# Patient Record
Sex: FEMALE | Race: WHITE | NOT HISPANIC OR LATINO | Employment: STUDENT | URBAN - METROPOLITAN AREA
[De-identification: names, ages, dates, MRNs, and addresses within clinical notes are randomized per-mention and may not be internally consistent; named-entity substitution may affect disease eponyms.]

---

## 2017-08-28 ENCOUNTER — CLINICAL SUPPORT (OUTPATIENT)
Dept: URGENT CARE | Facility: CLINIC | Age: 27
End: 2017-08-28
Payer: COMMERCIAL

## 2017-08-28 DIAGNOSIS — Z11.59 SCREENING FOR VIRAL DISEASE: Primary | ICD-10-CM

## 2017-08-28 DIAGNOSIS — Z11.1 SCREENING EXAMINATION FOR PULMONARY TUBERCULOSIS: ICD-10-CM

## 2017-08-28 PROCEDURE — 86580 TB INTRADERMAL TEST: CPT | Mod: S$GLB,,, | Performed by: EMERGENCY MEDICINE

## 2017-08-29 LAB
MEV IGG SER IA-ACNC: >300 AU/ML
RUBV IGG SERPL IA-ACNC: 3.35 INDEX

## 2017-08-30 ENCOUNTER — TELEPHONE (OUTPATIENT)
Dept: URGENT CARE | Facility: CLINIC | Age: 27
End: 2017-08-30

## 2017-08-30 LAB
SPECIMEN STATUS REPORT: NORMAL
TB INDURATION - 48 HR READ: 0 MM
TB INDURATION - 72 HR READ: NORMAL MM
TB SKIN TEST - 48 HR READ: NEGATIVE
TB SKIN TEST - 72 HR READ: NORMAL

## 2017-08-31 LAB
MUV IGG SER IA-ACNC: 242 AU/ML
SPECIMEN STATUS REPORT: NORMAL

## 2017-08-31 NOTE — PROGRESS NOTES
Subjective:       Patient ID: Josefina Boston is a 26 y.o. female.    Vitals:  vitals were not taken for this visit.    Chief Complaint: No chief complaint on file.    Ppd placed on 08/28/17 by Chalino MURRAY    Objective:      Physical Exam    Assessment:       1. Screening for viral disease    2. Screening examination for pulmonary tuberculosis        Plan:         Screening for viral disease  -     Mumps, IgG Screen  -     Rubeola antibody IgG  -     Rubella antibody, IgG    Screening examination for pulmonary tuberculosis  -     POCT TB Skin Test Read    Other orders  -     Specimen Status Report

## 2017-09-02 ENCOUNTER — TELEPHONE (OUTPATIENT)
Dept: URGENT CARE | Facility: CLINIC | Age: 27
End: 2017-09-02

## 2017-09-02 NOTE — TELEPHONE ENCOUNTER
----- Message from David Odom MD sent at 8/31/2017  7:50 PM CDT -----  These results are normal. Please notify the patient.

## 2020-03-23 RX ORDER — ALPRAZOLAM 0.25 MG/1
0.25 TABLET ORAL 2 TIMES DAILY PRN
Qty: 30 TABLET | Refills: 0 | Status: SHIPPED | OUTPATIENT
Start: 2020-03-23 | End: 2021-11-19

## 2020-03-23 NOTE — PROGRESS NOTES
Josefina was seen during staff rounds during COVIDpandemic . Coping fairly well but in need of alprazolam  0.25 mg refill.   Last filled 1-24-20 per PCP  #20.    Will refill for #30 .

## 2020-04-21 DIAGNOSIS — Z01.84 ANTIBODY RESPONSE EXAMINATION: ICD-10-CM

## 2020-05-14 ENCOUNTER — LAB VISIT (OUTPATIENT)
Dept: LAB | Facility: HOSPITAL | Age: 30
End: 2020-05-14
Attending: INTERNAL MEDICINE

## 2020-05-14 DIAGNOSIS — Z01.84 ANTIBODY RESPONSE EXAMINATION: ICD-10-CM

## 2020-05-14 LAB — SARS-COV-2 IGG SERPLBLD QL IA.RAPID: NEGATIVE

## 2020-05-14 PROCEDURE — 86769 SARS-COV-2 COVID-19 ANTIBODY: CPT

## 2020-05-14 PROCEDURE — 36415 COLL VENOUS BLD VENIPUNCTURE: CPT

## 2020-06-16 ENCOUNTER — NURSE TRIAGE (OUTPATIENT)
Dept: ADMINISTRATIVE | Facility: CLINIC | Age: 30
End: 2020-06-16

## 2020-06-16 NOTE — TELEPHONE ENCOUNTER
Pt admits to Muscle pain;Sore throat;Severe headache, she states she missed work today, she is a nurse at [employer], she went to [facility] and had covid test, she wants to know if its okay for her to return to work tomorrow, advised her to contact her supervisor and employee health and to call back with any other needs or concerns, caller agreed    Reason for Disposition   COVID-19 Home Isolation, questions about    Additional Information   Negative: SEVERE difficulty breathing (e.g., struggling for each breath, speaks in single words)   Negative: Difficult to awaken or acting confused (e.g., disoriented, slurred speech)   Negative: Bluish (or gray) lips or face now   Negative: Shock suspected (e.g., cold/pale/clammy skin, too weak to stand, low BP, rapid pulse)   Negative: Sounds like a life-threatening emergency to the triager   Negative: [1] COVID-19 exposure AND [2] NO symptoms   Negative: COVID-19 and Breastfeeding, questions about   Negative: [1] Adult with possible COVID-19 symptoms AND [2] triager concerned about severity of symptoms or other causes   Negative: SEVERE or constant chest pain or pressure (Exception: mild central chest pain, present only when coughing)   Negative: MODERATE difficulty breathing (e.g., speaks in phrases, SOB even at rest, pulse 100-120)   Negative: Patient sounds very sick or weak to the triager   Negative: MILD difficulty breathing (e.g., minimal/no SOB at rest, SOB with walking, pulse <100)   Negative: Chest pain or pressure   Negative: Fever > 103 F (39.4 C)   Negative: [1] Fever > 101 F (38.3 C) AND [2] age > 60   Negative: [1] Fever > 100.0 F (37.8 C) AND [2] bedridden (e.g., nursing home patient, CVA, chronic illness, recovering from surgery)   Negative: HIGH RISK patient (e.g., age > 64 years, diabetes, heart or lung disease, weak immune system)   Negative: Fever present > 3 days (72 hours)   Negative: [1] Fever returns after gone for over 24 hours AND [2]  symptoms worse or not improved   Negative: [1] Continuous (nonstop) coughing interferes with work or school AND [2] no improvement using cough treatment per protocol   Negative: [1] COVID-19 infection suspected by caller or triager AND [2] mild symptoms (cough, fever, or others) AND [3] no complications or SOB   Negative: Cough present > 3 weeks   Negative: [1] COVID-19 diagnosed by positive lab test AND [2] mild symptoms (e.g., cough, fever, others) AND [3] no complications or SOB   Negative: [1] COVID-19 diagnosed by HCP (doctor, NP or PA) AND [2] mild symptoms (e.g., cough, fever, others) AND [3] no complications or SOB    Protocols used: CORONAVIRUS (COVID-19) DIAGNOSED OR [id number]

## 2020-10-06 ENCOUNTER — CLINICAL SUPPORT (OUTPATIENT)
Dept: URGENT CARE | Facility: CLINIC | Age: 30
End: 2020-10-06
Payer: COMMERCIAL

## 2020-10-06 ENCOUNTER — NURSE TRIAGE (OUTPATIENT)
Dept: ADMINISTRATIVE | Facility: CLINIC | Age: 30
End: 2020-10-06

## 2020-10-06 DIAGNOSIS — J02.9 SORE THROAT: Primary | ICD-10-CM

## 2020-10-06 DIAGNOSIS — R09.81 SINUS CONGESTION: ICD-10-CM

## 2020-10-06 DIAGNOSIS — J02.9 SORE THROAT: ICD-10-CM

## 2020-10-06 LAB
CTP QC/QA: YES
SARS-COV-2 RDRP RESP QL NAA+PROBE: NEGATIVE

## 2020-10-06 PROCEDURE — U0002 COVID-19 LAB TEST NON-CDC: HCPCS | Mod: QW,S$GLB,, | Performed by: INTERNAL MEDICINE

## 2020-10-06 PROCEDURE — U0002: ICD-10-PCS | Mod: QW,S$GLB,, | Performed by: INTERNAL MEDICINE

## 2020-10-06 NOTE — TELEPHONE ENCOUNTER
Pt is [employer] employee. States she was exposed by a co-worker to COVID-19. Pt states she started with sore throat and sinus-like symptoms today. 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 Patient warm-transferred to Employee Health to set up testing.    Reason for Disposition   [1] Mild body aches, chills, diarrhea, headache, runny nose, or sore throat AND [2] within 14 days of COVID-19 EXPOSURE    Additional Information   Negative: Severe difficulty breathing (e.g., struggling for each breath, speaks in single words)   Negative: Bluish (or gray) lips or face now   Negative: Sounds like a life-threatening emergency to the triager   Negative: [1] Difficulty breathing occurs AND [2] within 14 days of COVID-19 EXPOSURE (Close Contact)   Negative: Patient sounds very sick or weak to the triager   Negative: [1] Fever (or feeling feverish) OR cough AND [2] within 14 Days of COVID-19 EXPOSURE (Close Contact)   Negative: [1] Fever (or feeling feverish) OR cough occurs AND [2] within 14 days of travel from another country (international travel)   Negative: [1] Fever (or feeling feverish) OR cough occurs AND [2] within 14 days of travel from a city or area with major community spread   Negative: [1] Fever (or feeling feverish) OR cough occurs AND [2] living in area with major community spread AND [3] testing being done in the community for symptoms    Protocols used: CORONAVIRUS (COVID-19) - EXPOSURE-A-

## 2020-10-07 ENCOUNTER — TELEPHONE (OUTPATIENT)
Dept: PRIMARY CARE CLINIC | Facility: CLINIC | Age: 30
End: 2020-10-07

## 2021-01-09 ENCOUNTER — IMMUNIZATION (OUTPATIENT)
Dept: INTERNAL MEDICINE | Facility: CLINIC | Age: 31
End: 2021-01-09
Payer: OTHER GOVERNMENT

## 2021-01-09 DIAGNOSIS — Z23 NEED FOR VACCINATION: ICD-10-CM

## 2021-01-09 PROCEDURE — 91300 COVID-19, MRNA, LNP-S, PF, 30 MCG/0.3 ML DOSE VACCINE: CPT | Mod: ,,, | Performed by: INTERNAL MEDICINE

## 2021-01-09 PROCEDURE — 91300 COVID-19, MRNA, LNP-S, PF, 30 MCG/0.3 ML DOSE VACCINE: ICD-10-PCS | Mod: ,,, | Performed by: INTERNAL MEDICINE

## 2021-01-09 PROCEDURE — 0001A COVID-19, MRNA, LNP-S, PF, 30 MCG/0.3 ML DOSE VACCINE: ICD-10-PCS | Mod: CV19,,, | Performed by: INTERNAL MEDICINE

## 2021-01-09 PROCEDURE — 0001A COVID-19, MRNA, LNP-S, PF, 30 MCG/0.3 ML DOSE VACCINE: CPT | Mod: CV19,,, | Performed by: INTERNAL MEDICINE

## 2021-01-30 ENCOUNTER — IMMUNIZATION (OUTPATIENT)
Dept: INTERNAL MEDICINE | Facility: CLINIC | Age: 31
End: 2021-01-30
Payer: OTHER GOVERNMENT

## 2021-01-30 DIAGNOSIS — Z23 NEED FOR VACCINATION: Primary | ICD-10-CM

## 2021-01-30 PROCEDURE — 91300 PR SARS-COV- 2 COVID-19 VACCINE, NO PRSV, 30MCG/0.3ML, IM: ICD-10-PCS | Mod: ,,, | Performed by: INTERNAL MEDICINE

## 2021-01-30 PROCEDURE — 0002A PR IMMUNIZ ADMIN, SARS-COV-2 COVID-19 VACC, 30MCG/0.3ML, 2ND DOSE: ICD-10-PCS | Mod: CV19,,, | Performed by: INTERNAL MEDICINE

## 2021-01-30 PROCEDURE — 0002A PR IMMUNIZ ADMIN, SARS-COV-2 COVID-19 VACC, 30MCG/0.3ML, 2ND DOSE: CPT | Mod: CV19,,, | Performed by: INTERNAL MEDICINE

## 2021-01-30 PROCEDURE — 91300 PR SARS-COV- 2 COVID-19 VACCINE, NO PRSV, 30MCG/0.3ML, IM: CPT | Mod: ,,, | Performed by: INTERNAL MEDICINE

## 2021-01-30 RX ADMIN — Medication 0.3 ML: at 02:01

## 2021-03-18 ENCOUNTER — PATIENT MESSAGE (OUTPATIENT)
Dept: RESEARCH | Facility: HOSPITAL | Age: 31
End: 2021-03-18

## 2021-03-26 ENCOUNTER — PATIENT MESSAGE (OUTPATIENT)
Dept: RESEARCH | Facility: HOSPITAL | Age: 31
End: 2021-03-26

## 2021-08-14 ENCOUNTER — OFFICE VISIT (OUTPATIENT)
Dept: URGENT CARE | Facility: CLINIC | Age: 31
End: 2021-08-14

## 2021-08-14 VITALS
DIASTOLIC BLOOD PRESSURE: 90 MMHG | HEART RATE: 71 BPM | TEMPERATURE: 98 F | RESPIRATION RATE: 18 BRPM | OXYGEN SATURATION: 96 % | SYSTOLIC BLOOD PRESSURE: 132 MMHG

## 2021-08-14 DIAGNOSIS — R05.9 COUGH: Primary | ICD-10-CM

## 2021-08-14 DIAGNOSIS — J06.9 VIRAL URI: ICD-10-CM

## 2021-08-14 LAB
CTP QC/QA: YES
SARS-COV-2 RDRP RESP QL NAA+PROBE: NEGATIVE

## 2021-08-14 PROCEDURE — U0002 COVID-19 LAB TEST NON-CDC: HCPCS | Mod: QW,S$GLB,, | Performed by: NURSE PRACTITIONER

## 2021-08-14 PROCEDURE — 99203 PR OFFICE/OUTPT VISIT, NEW, LEVL III, 30-44 MIN: ICD-10-PCS | Mod: S$GLB,CS,, | Performed by: NURSE PRACTITIONER

## 2021-08-14 PROCEDURE — 99203 OFFICE O/P NEW LOW 30 MIN: CPT | Mod: S$GLB,CS,, | Performed by: NURSE PRACTITIONER

## 2021-08-14 PROCEDURE — U0002: ICD-10-PCS | Mod: QW,S$GLB,, | Performed by: NURSE PRACTITIONER

## 2021-11-19 ENCOUNTER — OFFICE VISIT (OUTPATIENT)
Dept: URGENT CARE | Facility: CLINIC | Age: 31
End: 2021-11-19

## 2021-11-19 VITALS
SYSTOLIC BLOOD PRESSURE: 126 MMHG | HEART RATE: 84 BPM | OXYGEN SATURATION: 97 % | TEMPERATURE: 98 F | RESPIRATION RATE: 18 BRPM | DIASTOLIC BLOOD PRESSURE: 87 MMHG

## 2021-11-19 DIAGNOSIS — R05.9 COUGH: ICD-10-CM

## 2021-11-19 DIAGNOSIS — U07.1 COVID-19: Primary | ICD-10-CM

## 2021-11-19 LAB
CTP QC/QA: YES
CTP QC/QA: YES
POC MOLECULAR INFLUENZA A AGN: NEGATIVE
POC MOLECULAR INFLUENZA B AGN: NEGATIVE
SARS-COV-2 RDRP RESP QL NAA+PROBE: POSITIVE

## 2021-11-19 PROCEDURE — 99213 OFFICE O/P EST LOW 20 MIN: CPT | Mod: S$GLB,,, | Performed by: FAMILY MEDICINE

## 2021-11-19 PROCEDURE — U0002: ICD-10-PCS | Mod: QW,S$GLB,, | Performed by: FAMILY MEDICINE

## 2021-11-19 PROCEDURE — 87502 INFLUENZA DNA AMP PROBE: CPT | Mod: QW,S$GLB,, | Performed by: FAMILY MEDICINE

## 2021-11-19 PROCEDURE — 87502 POCT INFLUENZA A/B MOLECULAR: ICD-10-PCS | Mod: QW,S$GLB,, | Performed by: FAMILY MEDICINE

## 2021-11-19 PROCEDURE — 99213 PR OFFICE/OUTPT VISIT, EST, LEVL III, 20-29 MIN: ICD-10-PCS | Mod: S$GLB,,, | Performed by: FAMILY MEDICINE

## 2021-11-19 PROCEDURE — U0002 COVID-19 LAB TEST NON-CDC: HCPCS | Mod: QW,S$GLB,, | Performed by: FAMILY MEDICINE

## 2021-11-22 ENCOUNTER — INFUSION (OUTPATIENT)
Dept: INFECTIOUS DISEASES | Facility: HOSPITAL | Age: 31
End: 2021-11-22
Attending: FAMILY MEDICINE
Payer: OTHER GOVERNMENT

## 2021-11-22 VITALS
HEIGHT: 62 IN | WEIGHT: 135 LBS | SYSTOLIC BLOOD PRESSURE: 126 MMHG | HEART RATE: 71 BPM | DIASTOLIC BLOOD PRESSURE: 82 MMHG | RESPIRATION RATE: 18 BRPM | OXYGEN SATURATION: 98 % | BODY MASS INDEX: 24.84 KG/M2 | TEMPERATURE: 98 F

## 2021-11-22 DIAGNOSIS — U07.1 COVID-19: Primary | ICD-10-CM

## 2021-11-22 PROCEDURE — 63600175 PHARM REV CODE 636 W HCPCS: Performed by: INTERNAL MEDICINE

## 2021-11-22 PROCEDURE — 25000003 PHARM REV CODE 250: Performed by: INTERNAL MEDICINE

## 2021-11-22 PROCEDURE — M0243 CASIRIVI AND IMDEVI INFUSION: HCPCS | Performed by: INTERNAL MEDICINE

## 2021-11-22 RX ORDER — DIPHENHYDRAMINE HYDROCHLORIDE 50 MG/ML
25 INJECTION INTRAMUSCULAR; INTRAVENOUS ONCE AS NEEDED
Status: DISCONTINUED | OUTPATIENT
Start: 2021-11-22 | End: 2022-06-22

## 2021-11-22 RX ORDER — ALBUTEROL SULFATE 90 UG/1
2 AEROSOL, METERED RESPIRATORY (INHALATION)
Status: DISCONTINUED | OUTPATIENT
Start: 2021-11-22 | End: 2022-06-22

## 2021-11-22 RX ORDER — SODIUM CHLORIDE 0.9 % (FLUSH) 0.9 %
10 SYRINGE (ML) INJECTION
Status: DISCONTINUED | OUTPATIENT
Start: 2021-11-22 | End: 2022-06-22

## 2021-11-22 RX ORDER — ACETAMINOPHEN 325 MG/1
650 TABLET ORAL ONCE AS NEEDED
Status: DISCONTINUED | OUTPATIENT
Start: 2021-11-22 | End: 2022-06-22

## 2021-11-22 RX ORDER — ONDANSETRON 4 MG/1
4 TABLET, ORALLY DISINTEGRATING ORAL ONCE AS NEEDED
Status: DISCONTINUED | OUTPATIENT
Start: 2021-11-22 | End: 2022-06-22

## 2021-11-22 RX ORDER — EPINEPHRINE 0.3 MG/.3ML
0.3 INJECTION SUBCUTANEOUS
Status: DISCONTINUED | OUTPATIENT
Start: 2021-11-22 | End: 2022-06-22

## 2021-11-22 RX ADMIN — CASIRIVIMAB AND IMDEVIMAB 600 MG: 600; 600 INJECTION, SOLUTION, CONCENTRATE INTRAVENOUS at 10:11

## 2022-02-22 ENCOUNTER — PATIENT MESSAGE (OUTPATIENT)
Dept: RESEARCH | Facility: HOSPITAL | Age: 32
End: 2022-02-22
Payer: OTHER GOVERNMENT

## 2022-02-25 RX ORDER — FLUCONAZOLE 150 MG/1
150 TABLET ORAL DAILY
Qty: 10 TABLET | Refills: 0 | Status: SHIPPED | OUTPATIENT
Start: 2022-02-25 | End: 2022-03-07

## 2022-05-23 ENCOUNTER — OFFICE VISIT (OUTPATIENT)
Dept: OBSTETRICS AND GYNECOLOGY | Facility: CLINIC | Age: 32
End: 2022-05-23
Payer: COMMERCIAL

## 2022-05-23 ENCOUNTER — LAB VISIT (OUTPATIENT)
Dept: LAB | Facility: OTHER | Age: 32
End: 2022-05-23
Attending: OBSTETRICS & GYNECOLOGY
Payer: COMMERCIAL

## 2022-05-23 VITALS
SYSTOLIC BLOOD PRESSURE: 108 MMHG | DIASTOLIC BLOOD PRESSURE: 62 MMHG | BODY MASS INDEX: 24.29 KG/M2 | WEIGHT: 132 LBS | HEIGHT: 62 IN

## 2022-05-23 DIAGNOSIS — E34.9 HORMONE IMBALANCE: ICD-10-CM

## 2022-05-23 DIAGNOSIS — R79.89 LOW VITAMIN D LEVEL: ICD-10-CM

## 2022-05-23 DIAGNOSIS — Z01.419 ENCOUNTER FOR ANNUAL ROUTINE GYNECOLOGICAL EXAMINATION: Primary | ICD-10-CM

## 2022-05-23 DIAGNOSIS — Z00.00 HEALTH CARE MAINTENANCE: ICD-10-CM

## 2022-05-23 DIAGNOSIS — Z12.4 PAP SMEAR FOR CERVICAL CANCER SCREENING: ICD-10-CM

## 2022-05-23 DIAGNOSIS — Z11.3 SCREENING FOR STDS (SEXUALLY TRANSMITTED DISEASES): ICD-10-CM

## 2022-05-23 DIAGNOSIS — N94.3 PMS (PREMENSTRUAL SYNDROME): ICD-10-CM

## 2022-05-23 DIAGNOSIS — Z30.09 GENERAL COUNSELING AND ADVICE FOR CONTRACEPTIVE MANAGEMENT: ICD-10-CM

## 2022-05-23 LAB
25(OH)D3+25(OH)D2 SERPL-MCNC: 22 NG/ML (ref 30–96)
ALBUMIN SERPL BCP-MCNC: 4.3 G/DL (ref 3.5–5.2)
ALP SERPL-CCNC: 44 U/L (ref 55–135)
ALT SERPL W/O P-5'-P-CCNC: 13 U/L (ref 10–44)
ANION GAP SERPL CALC-SCNC: 10 MMOL/L (ref 8–16)
AST SERPL-CCNC: 18 U/L (ref 10–40)
BASOPHILS # BLD AUTO: 0.03 K/UL (ref 0–0.2)
BASOPHILS NFR BLD: 0.6 % (ref 0–1.9)
BILIRUB SERPL-MCNC: 0.6 MG/DL (ref 0.1–1)
BUN SERPL-MCNC: 10 MG/DL (ref 6–20)
CALCIUM SERPL-MCNC: 9.3 MG/DL (ref 8.7–10.5)
CHLORIDE SERPL-SCNC: 105 MMOL/L (ref 95–110)
CHOLEST SERPL-MCNC: 144 MG/DL (ref 120–199)
CHOLEST/HDLC SERPL: 2.1 {RATIO} (ref 2–5)
CO2 SERPL-SCNC: 22 MMOL/L (ref 23–29)
CREAT SERPL-MCNC: 0.8 MG/DL (ref 0.5–1.4)
DHEA-S SERPL-MCNC: 301.4 UG/DL (ref 95.8–511.7)
DIFFERENTIAL METHOD: NORMAL
EOSINOPHIL # BLD AUTO: 0.1 K/UL (ref 0–0.5)
EOSINOPHIL NFR BLD: 1 % (ref 0–8)
ERYTHROCYTE [DISTWIDTH] IN BLOOD BY AUTOMATED COUNT: 12.6 % (ref 11.5–14.5)
EST. GFR  (AFRICAN AMERICAN): >60 ML/MIN/1.73 M^2
EST. GFR  (NON AFRICAN AMERICAN): >60 ML/MIN/1.73 M^2
ESTRADIOL SERPL-MCNC: 122 PG/ML
GLUCOSE SERPL-MCNC: 83 MG/DL (ref 70–110)
HCT VFR BLD AUTO: 41.8 % (ref 37–48.5)
HDLC SERPL-MCNC: 67 MG/DL (ref 40–75)
HDLC SERPL: 46.5 % (ref 20–50)
HGB BLD-MCNC: 13.9 G/DL (ref 12–16)
IMM GRANULOCYTES # BLD AUTO: 0.01 K/UL (ref 0–0.04)
IMM GRANULOCYTES NFR BLD AUTO: 0.2 % (ref 0–0.5)
LDLC SERPL CALC-MCNC: 69.2 MG/DL (ref 63–159)
LYMPHOCYTES # BLD AUTO: 1.5 K/UL (ref 1–4.8)
LYMPHOCYTES NFR BLD: 29 % (ref 18–48)
MCH RBC QN AUTO: 27.9 PG (ref 27–31)
MCHC RBC AUTO-ENTMCNC: 33.3 G/DL (ref 32–36)
MCV RBC AUTO: 84 FL (ref 82–98)
MONOCYTES # BLD AUTO: 0.4 K/UL (ref 0.3–1)
MONOCYTES NFR BLD: 7.3 % (ref 4–15)
NEUTROPHILS # BLD AUTO: 3.2 K/UL (ref 1.8–7.7)
NEUTROPHILS NFR BLD: 61.9 % (ref 38–73)
NONHDLC SERPL-MCNC: 77 MG/DL
NRBC BLD-RTO: 0 /100 WBC
PLATELET # BLD AUTO: 185 K/UL (ref 150–450)
PMV BLD AUTO: 9.7 FL (ref 9.2–12.9)
POTASSIUM SERPL-SCNC: 4.1 MMOL/L (ref 3.5–5.1)
PROT SERPL-MCNC: 7.5 G/DL (ref 6–8.4)
RBC # BLD AUTO: 4.99 M/UL (ref 4–5.4)
SODIUM SERPL-SCNC: 137 MMOL/L (ref 136–145)
TESTOST SERPL-MCNC: 34 NG/DL (ref 5–73)
TRIGL SERPL-MCNC: 39 MG/DL (ref 30–150)
TSH SERPL DL<=0.005 MIU/L-ACNC: 0.99 UIU/ML (ref 0.4–4)
WBC # BLD AUTO: 5.1 K/UL (ref 3.9–12.7)

## 2022-05-23 PROCEDURE — 87389 HIV-1 AG W/HIV-1&-2 AB AG IA: CPT | Performed by: OBSTETRICS & GYNECOLOGY

## 2022-05-23 PROCEDURE — 99385 PR PREVENTIVE VISIT,NEW,18-39: ICD-10-PCS | Mod: S$GLB,,, | Performed by: OBSTETRICS & GYNECOLOGY

## 2022-05-23 PROCEDURE — 80053 COMPREHEN METABOLIC PANEL: CPT | Performed by: OBSTETRICS & GYNECOLOGY

## 2022-05-23 PROCEDURE — 87491 CHLMYD TRACH DNA AMP PROBE: CPT | Performed by: OBSTETRICS & GYNECOLOGY

## 2022-05-23 PROCEDURE — 87340 HEPATITIS B SURFACE AG IA: CPT | Performed by: OBSTETRICS & GYNECOLOGY

## 2022-05-23 PROCEDURE — 82627 DEHYDROEPIANDROSTERONE: CPT | Performed by: OBSTETRICS & GYNECOLOGY

## 2022-05-23 PROCEDURE — 82306 VITAMIN D 25 HYDROXY: CPT | Performed by: OBSTETRICS & GYNECOLOGY

## 2022-05-23 PROCEDURE — 84403 ASSAY OF TOTAL TESTOSTERONE: CPT | Performed by: OBSTETRICS & GYNECOLOGY

## 2022-05-23 PROCEDURE — 99999 PR PBB SHADOW E&M-EST. PATIENT-LVL III: CPT | Mod: PBBFAC,,, | Performed by: OBSTETRICS & GYNECOLOGY

## 2022-05-23 PROCEDURE — 84402 ASSAY OF FREE TESTOSTERONE: CPT | Performed by: OBSTETRICS & GYNECOLOGY

## 2022-05-23 PROCEDURE — 82670 ASSAY OF TOTAL ESTRADIOL: CPT | Performed by: OBSTETRICS & GYNECOLOGY

## 2022-05-23 PROCEDURE — 99213 OFFICE O/P EST LOW 20 MIN: CPT | Mod: PBBFAC | Performed by: OBSTETRICS & GYNECOLOGY

## 2022-05-23 PROCEDURE — 99999 PR PBB SHADOW E&M-EST. PATIENT-LVL III: ICD-10-PCS | Mod: PBBFAC,,, | Performed by: OBSTETRICS & GYNECOLOGY

## 2022-05-23 PROCEDURE — 86592 SYPHILIS TEST NON-TREP QUAL: CPT | Performed by: OBSTETRICS & GYNECOLOGY

## 2022-05-23 PROCEDURE — 87591 N.GONORRHOEAE DNA AMP PROB: CPT | Performed by: OBSTETRICS & GYNECOLOGY

## 2022-05-23 PROCEDURE — 85025 COMPLETE CBC W/AUTO DIFF WBC: CPT | Performed by: OBSTETRICS & GYNECOLOGY

## 2022-05-23 PROCEDURE — 88142 CYTOPATH C/V THIN LAYER: CPT | Performed by: OBSTETRICS & GYNECOLOGY

## 2022-05-23 PROCEDURE — 80061 LIPID PANEL: CPT | Performed by: OBSTETRICS & GYNECOLOGY

## 2022-05-23 PROCEDURE — 99385 PREV VISIT NEW AGE 18-39: CPT | Mod: S$GLB,,, | Performed by: OBSTETRICS & GYNECOLOGY

## 2022-05-23 PROCEDURE — 86803 HEPATITIS C AB TEST: CPT | Performed by: OBSTETRICS & GYNECOLOGY

## 2022-05-23 PROCEDURE — 36415 COLL VENOUS BLD VENIPUNCTURE: CPT | Performed by: OBSTETRICS & GYNECOLOGY

## 2022-05-23 PROCEDURE — 84443 ASSAY THYROID STIM HORMONE: CPT | Performed by: OBSTETRICS & GYNECOLOGY

## 2022-05-24 LAB
C TRACH DNA SPEC QL NAA+PROBE: NOT DETECTED
HBV SURFACE AG SERPL QL IA: NEGATIVE
HCV AB SERPL QL IA: NEGATIVE
HIV 1+2 AB+HIV1 P24 AG SERPL QL IA: NEGATIVE
N GONORRHOEA DNA SPEC QL NAA+PROBE: NOT DETECTED
RPR SER QL: NORMAL

## 2022-05-26 LAB
FINAL PATHOLOGIC DIAGNOSIS: NORMAL
Lab: NORMAL

## 2022-06-03 LAB — TESTOST FREE SERPL DL<=1.0 NG/DL-MCNC: 2.9 PG/ML (ref 0.8–7.4)

## 2022-06-17 NOTE — PROGRESS NOTES
"  Chief Complaint: Well Woman Exam   Patient known to me from . Last seen 3 years ago.  HPI:      Josefina Boston is a 31 y.o.  who presents for annual exam. She is currently complaining of irritable mood prior to menstrual cycle and cycle irregularity. She is also overdue for annual exam.    She feels like this has been occurring for the past few months and only right before her menstrual cycle. She feels her mood is irritable with occasional sadness or anxiety. Cycles are regular lasting 3-4 days. Her mother went through menopause in early 40s. No menopausal symptoms. Denies suicidal or homicidal ideations. She feels her hormones are "off" and having acne and pain prior to cycles that is also new. She has also skipped some cycles in past year with last menstrual cycle at end of April. Multiple UPT tests were negative.    Ms. Boston is currently sexually active with a single male partner. She would like STD screening today. Patient has regular monthly menses. Patient's last menstrual period was 2022 (within days). She is currently using condoms for contraception.     Previous Pap: no abnormalities  Last done at . Unsure of date.    Gardasil:Completed     OB History        0    Para   0    Term   0       0    AB   0    Living   0       SAB   0    IAB   0    Ectopic   0    Multiple   0    Live Births   0               ROS:     GENERAL: Denies unintentional weight gain or weight loss. Feeling well overall.   SKIN: Denies rash or lesions.   HEENT: Denies headaches, or vision changes.   CARDIOVASCULAR: Denies palpitations or chest pain.   RESPIRATORY: Denies shortness of breath or dyspnea on exertion.  BREASTS: Denies pain, lumps, or nipple discharge.   ABDOMEN: Denies abdominal pain, occ cramping with constipation or diarrhea- ?IBS diagnosis, nausea, vomiting, or change in appetite.   URINARY: Denies frequency, dysuria, hematuria.  NEUROLOGIC: Denies syncope or weakness.   PSYCHIATRIC: " "Denies depression, anxiety or mood swings.    Physical Exam:      PHYSICAL EXAM:  /62   Ht 5' 2" (1.575 m)   Wt 59.9 kg (132 lb)   LMP 04/25/2022 (Within Days)   BMI 24.14 kg/m²   Body mass index is 24.14 kg/m².     APPEARANCE: Well nourished, well developed, in no acute distress.  PSYCH: Appropriate mood and affect.  SKIN: No acne or hirsutism  NECK: Neck symmetric without masses or thyromegaly  NODES: No inguinal, axillary, or supraclavicular lymph node enlargement  CHEST: Normal respiratory effort.  ABDOMEN: Soft.  No tenderness or masses.   BREASTS: Symmetrical, no skin changes or visible lesions.  No palpable masses or nipple discharge bilaterally.  PELVIC: Normal external genitalia without lesions.  Normal hair distribution.  Adequate perineal body, normal urethral meatus.  Vagina moist and well rugated without lesions or discharge.  Cervix pink, without lesions, discharge or tenderness.  No significant cystocele or rectocele.  Bimanual exam shows uterus to be normal size, regular, mobile and nontender.  Adnexa without masses or tenderness.    EXTREMITIES: No edema.    Assessment/Plan:     Encounter for annual routine gynecological examination  Normal exam today. Pap smear done today. STD screen ordered today. Gardasil completed. Contraception options reviewed and desires Kyleena. Authorization sent. Discussed all menstrual symptoms are likely due to premenstrual syndrome that is likely associated with a hormone imbalance. She requested recheck of Vitamin D in past and has still had fatigue though feel more likely due to hormone imbalance. Screening health labs ordered and referral to PCP done today.    Health care maintenance  -     CBC Auto Differential; Future; Expected date: 05/23/2022  -     Comprehensive Metabolic Panel; Future; Expected date: 05/23/2022  -     Lipid Panel; Future; Expected date: 05/23/2022  -     TSH; Future; Expected date: 05/23/2022  -     Ambulatory referral/consult to " Internal Medicine; Future; Expected date: 05/30/2022    Low vitamin D level  -     Vitamin D; Future; Expected date: 05/23/2022    Hormone imbalance  -     Estradiol; Future; Expected date: 05/23/2022  -     DHEA-Sulfate; Future; Expected date: 05/23/2022  -     Testosterone, Free; Future; Expected date: 05/23/2022  -     Testosterone; Future; Expected date: 05/23/2022  -     Device Authorization Order: Kalpana    Screening for STDs (sexually transmitted diseases)  -     Hepatitis B Surface Antigen; Future; Expected date: 05/23/2022  -     Hepatitis C Antibody; Future; Expected date: 08/23/2022  -     HIV 1/2 Ag/Ab (4th Gen); Future; Expected date: 05/23/2022  -     RPR; Future; Expected date: 05/23/2022  -     C. trachomatis/N. gonorrhoeae by AMP DNA [facility]; Cervix    Pap smear for cervical cancer screening  -     Liquid-Based Pap Smear, Screening    General counseling and advice for contraceptive management  -     Device Authorization Order: Kalpana    PMS (premenstrual syndrome)  -     Device Authorization Order: Kyjiena    RTC 1 year for annual and prn pending results    Counseling:     Patient was counseled today on current ASCCP pap guidelines, the recommendation for yearly pelvic exams, healthy diet and exercise routines, breast self awareness. She is to see her PCP for other health maintenance.       Use of the [facility] Patient Portal discussed and encouraged during today's visit.       Bridgett Mcmullen MD    As of April 1, 2021, the Cures Act has been passed nationally. This new law requires that all doctors progress notes, lab results, pathology reports and radiology reports be released IMMEDIATELY to the patient in the patient portal. That means that the results are released to you at the EXACT same time they are released to me. Therefore, with all of the patients that I have I am not able to reply to each patient exactly when the results come in. So there will be a delay from when you see the results to  when I see them and have time to come up with a response to send you. Also I only see these results when I am on the computer at work. So if the results come in over the weekend or after 5 pm of a work day, I will not see them until the next business day. As you can tell, this is a challenge as a physician to give every patient the quick response they hope for and deserve. So please be patient! Thanks for understanding, Dr. Mcmullen

## 2022-06-22 ENCOUNTER — TELEPHONE (OUTPATIENT)
Dept: OBSTETRICS AND GYNECOLOGY | Facility: CLINIC | Age: 32
End: 2022-06-22
Payer: COMMERCIAL

## 2022-06-22 ENCOUNTER — PATIENT MESSAGE (OUTPATIENT)
Dept: OBSTETRICS AND GYNECOLOGY | Facility: CLINIC | Age: 32
End: 2022-06-22
Payer: COMMERCIAL

## 2022-06-22 NOTE — TELEPHONE ENCOUNTER
----- Message from Bridgett Mcmullen MD sent at 6/22/2022  6:51 AM CDT -----  Regarding: Follow up  Can you check on status of her Kyleena (order sent 5/23) and IM referral as it still says pending.

## 2022-07-21 ENCOUNTER — PATIENT MESSAGE (OUTPATIENT)
Dept: GASTROENTEROLOGY | Facility: CLINIC | Age: 32
End: 2022-07-21
Payer: COMMERCIAL

## 2022-08-09 ENCOUNTER — TELEPHONE (OUTPATIENT)
Dept: ENDOSCOPY | Facility: HOSPITAL | Age: 32
End: 2022-08-09
Payer: COMMERCIAL

## 2022-08-09 ENCOUNTER — LAB VISIT (OUTPATIENT)
Dept: LAB | Facility: HOSPITAL | Age: 32
End: 2022-08-09
Attending: INTERNAL MEDICINE
Payer: COMMERCIAL

## 2022-08-09 ENCOUNTER — OFFICE VISIT (OUTPATIENT)
Dept: GASTROENTEROLOGY | Facility: CLINIC | Age: 32
End: 2022-08-09
Payer: COMMERCIAL

## 2022-08-09 VITALS
SYSTOLIC BLOOD PRESSURE: 122 MMHG | HEART RATE: 66 BPM | WEIGHT: 137.31 LBS | BODY MASS INDEX: 25.27 KG/M2 | DIASTOLIC BLOOD PRESSURE: 83 MMHG | HEIGHT: 62 IN

## 2022-08-09 DIAGNOSIS — Z83.49 FAMILY HISTORY OF THYROID DISEASE: ICD-10-CM

## 2022-08-09 DIAGNOSIS — R63.5 WEIGHT GAIN: ICD-10-CM

## 2022-08-09 DIAGNOSIS — R14.0 ABDOMINAL BLOATING: ICD-10-CM

## 2022-08-09 DIAGNOSIS — R19.4 CHANGE IN BOWEL HABITS: ICD-10-CM

## 2022-08-09 DIAGNOSIS — K59.04 CHRONIC IDIOPATHIC CONSTIPATION: Primary | ICD-10-CM

## 2022-08-09 DIAGNOSIS — K59.04 CHRONIC IDIOPATHIC CONSTIPATION: ICD-10-CM

## 2022-08-09 LAB
HCG INTACT+B SERPL-ACNC: <2.4 MIU/ML
LIPASE SERPL-CCNC: 28 U/L (ref 4–60)

## 2022-08-09 PROCEDURE — 3008F BODY MASS INDEX DOCD: CPT | Mod: CPTII,S$GLB,, | Performed by: INTERNAL MEDICINE

## 2022-08-09 PROCEDURE — 3074F SYST BP LT 130 MM HG: CPT | Mod: CPTII,S$GLB,, | Performed by: INTERNAL MEDICINE

## 2022-08-09 PROCEDURE — 83516 IMMUNOASSAY NONANTIBODY: CPT | Performed by: INTERNAL MEDICINE

## 2022-08-09 PROCEDURE — 99999 PR PBB SHADOW E&M-EST. PATIENT-LVL IV: CPT | Mod: PBBFAC,,, | Performed by: INTERNAL MEDICINE

## 2022-08-09 PROCEDURE — 99999 PR PBB SHADOW E&M-EST. PATIENT-LVL IV: ICD-10-PCS | Mod: PBBFAC,,, | Performed by: INTERNAL MEDICINE

## 2022-08-09 PROCEDURE — 1159F PR MEDICATION LIST DOCUMENTED IN MEDICAL RECORD: ICD-10-PCS | Mod: CPTII,S$GLB,, | Performed by: INTERNAL MEDICINE

## 2022-08-09 PROCEDURE — 1159F MED LIST DOCD IN RCRD: CPT | Mod: CPTII,S$GLB,, | Performed by: INTERNAL MEDICINE

## 2022-08-09 PROCEDURE — 99204 OFFICE O/P NEW MOD 45 MIN: CPT | Mod: S$GLB,,, | Performed by: INTERNAL MEDICINE

## 2022-08-09 PROCEDURE — 84702 CHORIONIC GONADOTROPIN TEST: CPT | Performed by: INTERNAL MEDICINE

## 2022-08-09 PROCEDURE — 99204 PR OFFICE/OUTPT VISIT, NEW, LEVL IV, 45-59 MIN: ICD-10-PCS | Mod: S$GLB,,, | Performed by: INTERNAL MEDICINE

## 2022-08-09 PROCEDURE — 3079F PR MOST RECENT DIASTOLIC BLOOD PRESSURE 80-89 MM HG: ICD-10-PCS | Mod: CPTII,S$GLB,, | Performed by: INTERNAL MEDICINE

## 2022-08-09 PROCEDURE — 3079F DIAST BP 80-89 MM HG: CPT | Mod: CPTII,S$GLB,, | Performed by: INTERNAL MEDICINE

## 2022-08-09 PROCEDURE — 1160F PR REVIEW ALL MEDS BY PRESCRIBER/CLIN PHARMACIST DOCUMENTED: ICD-10-PCS | Mod: CPTII,S$GLB,, | Performed by: INTERNAL MEDICINE

## 2022-08-09 PROCEDURE — 83690 ASSAY OF LIPASE: CPT | Performed by: INTERNAL MEDICINE

## 2022-08-09 PROCEDURE — 3008F PR BODY MASS INDEX (BMI) DOCUMENTED: ICD-10-PCS | Mod: CPTII,S$GLB,, | Performed by: INTERNAL MEDICINE

## 2022-08-09 PROCEDURE — 1160F RVW MEDS BY RX/DR IN RCRD: CPT | Mod: CPTII,S$GLB,, | Performed by: INTERNAL MEDICINE

## 2022-08-09 PROCEDURE — 3074F PR MOST RECENT SYSTOLIC BLOOD PRESSURE < 130 MM HG: ICD-10-PCS | Mod: CPTII,S$GLB,, | Performed by: INTERNAL MEDICINE

## 2022-08-09 NOTE — Clinical Note
Edwar please schedule patient for nonfasting lab work today  Please schedule patient for referral to Allergy Clinic for food allergy testing  Please refer patient to Endocrinology for evaluation of endocrine disorders.  Please have patient see schedulers today to schedule colonoscopy  Prescription for Linzess sent to her pharmacy  Return to GI clinic telemedicine video visit 8 weeks for follow-up

## 2022-08-09 NOTE — PROGRESS NOTES
[facility] Consultation Note    Reason for Consult:  The primary encounter diagnosis was Chronic idiopathic constipation. Diagnoses of Change in bowel habits, Abdominal bloating, Family history of thyroid disease, and Weight gain were also pertinent to this visit.    PCP:   Primary Doctor No   No address on file    Referring MD:  Aaareferral Self  No address on file    Initial History of Present Illness (HPI):  This is a 31 y.o. female here for evaluation of change in bowel habits over the last 2 years.  Patient states she is 1st started noticing she was constipated when she was a teenager she is now having a bowel movement once a week if she does not use any laxative she has to use an enema once a week another laxatives to have a bowel movement she has tried MiraLax in the past for several weeks but no benefit along with fiber gives her a lot of gas and bloating and distension she has noticed she has a foul odor to her flatus she does not have any blood in her stool she does not have to do any positioning maneuvers when she uses the bathroom for a bowel movement she does get some abdominal discomfort prior to having a bowel movement due to constipation but after having a bowel movement that resolves she has noticed she gets abdominal distension when she is constipated she is worried she could have a potential food allergy she also has family history of thyroid disease in 2 family members she would like referral to Allergy for food allergy testing and referral to Endocrinology to rule out endocrine disorder.  She denies any dysphagia heartburn odynophagia no family history of any GI malignancies no FAP no attenuated FAP no MA P no Zamudio syndrome nobody with celiac sprue or inflammatory bowel disease she is not on a gluten free diet.  She has never had an EGD or colonoscopy no abdominal surgeries.  He has gained a few lb over the last year or so she exercises regularly twice weekly doing  "cardio and weights.  No GI bleeding.  No oral grease or fat in her stool.  She does say her flatus has a strong odor.  We did caution her on he drinking or eating too much sugar E foods.    Abdominal pain -as above  Reflux - no  Dysphagia - no   Bowel habits - as above  GI bleeding - none  NSAID usage - none    Interval HPI 08/09/2022:  The patient's last visit with me was on Visit date not found.      ROS:  Constitutional: No fevers, chills, No weight loss she has actually gained a few lb over the last year to  ENT:  No heartburn no dysphagia no odynophagia no hoarseness  CV: No chest pain, no palpitation  Pulm: No cough, No shortness of breath, no wheezing  Ophtho: No vision changes  GI: see HPI  Derm: No rash, no itching  Heme: No lymphadenopathy, No easy bruising  MSK: No significant arthritis  : No dysuria, No hematuria  Endo: No hot or cold intolerance  Neuro: No syncope, No seizure, no strokes  Psych: No uncontrolled anxiety, No uncontrolled depression    Medical History:  has a past medical history of Attention deficit disorder (ADD).    Surgical History:  has no past surgical history on file.    Family History: family history includes Hypertension in her father and mother; Hypothyroidism in her father and sister; No Known Problems in her maternal aunt, maternal grandfather, maternal uncle, paternal aunt, paternal grandfather, paternal grandmother, and paternal uncle; Stroke in her mother..     Social History:  reports that she has never smoked. She has never used smokeless tobacco. She reports current alcohol use.    Review of patient's allergies indicates:  No Known Allergies    Medication List with Changes/Refills   New Medications    LINACLOTIDE (LINZESS) 145 MCG CAP CAPSULE    Take 1 capsule (145 mcg total) by mouth before breakfast.         Objective Findings:    Vital Signs:  /83   Pulse 66   Ht 5' 2" (1.575 m)   Wt 62.3 kg (137 lb 4.8 oz)   LMP 07/18/2022   BMI 25.11 kg/m²   Body mass " index is 25.11 kg/m².    Physical Exam:  General Appearance: Well appearing in no acute distress  Eyes:    No scleral icterus  ENT:  No lesions or masses   Lungs: CTA bilaterally, no wheezes, no rhonchi, no rales  Heart:  S1, S2 normal, no murmurs heard  Abdomen:  Non distended, soft, no guarding, no rebound, no tenderness, no appreciated ascites, no bruits, no hepatosplenomegaly,  No CVA tenderness, no appreciated hernias, no Virk sign no McBurney point tenderness.  Musculoskeletal:  No major joint deformities  Skin: No petechiae or rash on exposed skin areas  Neurologic:  Alert and oriented x4  Psychiatric:  Normal speech mentation and affect    Labs:  Lab Results   Component Value Date    WBC 5.10 05/23/2022    HGB 13.9 05/23/2022    HCT 41.8 05/23/2022     05/23/2022    CHOL 144 05/23/2022    TRIG 39 05/23/2022    HDL 67 05/23/2022    ALT 13 05/23/2022    AST 18 05/23/2022     05/23/2022    K 4.1 05/23/2022     05/23/2022    CREATININE 0.8 05/23/2022    BUN 10 05/23/2022    CO2 22 (L) 05/23/2022    TSH 0.994 05/23/2022               Medical Decision Making:  Lab work reviewed  Constipation talk given  Constipation bowel prep talk given  Linzess talk given  Patient request referral to Allergy for food allergy testing and Endocrinology      Assessment:  1. Chronic idiopathic constipation    2. Change in bowel habits    3. Abdominal bloating    4. Family history of thyroid disease    5. Weight gain         Recommendations:  1. Lab work today and stool studies  2. Colonoscopy constipation bowel prep protocol  3. Start Linzess 145 mcg once daily for constipation  4. Referral to Allergy for food allergy testing   5. Referral to Endocrinology to evaluate for endocrine disorder family history of thyroid disease    Follow up in about 8 weeks (around 10/4/2022).      Order summary:  Orders Placed This Encounter    Stool culture    Clostridium difficile EIA    HCG, Quantitative    Celiac Disease  Panel    Stool Exam-Ova,Cysts,Parasites    Micropsoridia species, PCR    Cyclospora    Fecal fat, qualitative    Pancreatic elastase, fecal    Lipase    Ambulatory referral/consult to Endocrinology    Ambulatory referral/consult to Allergy    linaCLOtide (LINZESS) 145 mcg Cap capsule    Case Request Endoscopy: COLONOSCOPY         Thank you so much for allowing me to participate in the care of Josefina Vides MD

## 2022-08-11 LAB
GLIADIN PEPTIDE IGA SER-ACNC: 6 UNITS
GLIADIN PEPTIDE IGG SER-ACNC: 2 UNITS
IGA SERPL-MCNC: 230 MG/DL (ref 70–400)
TTG IGA SER-ACNC: 8 UNITS
TTG IGG SER-ACNC: 4 UNITS

## 2022-08-23 ENCOUNTER — PATIENT MESSAGE (OUTPATIENT)
Dept: PRIMARY CARE CLINIC | Facility: CLINIC | Age: 32
End: 2022-08-23
Payer: COMMERCIAL

## 2022-08-25 ENCOUNTER — TELEPHONE (OUTPATIENT)
Dept: OBSTETRICS AND GYNECOLOGY | Facility: CLINIC | Age: 32
End: 2022-08-25
Payer: COMMERCIAL

## 2022-08-25 NOTE — TELEPHONE ENCOUNTER
----- Message from Bridgett Mcmullen MD sent at 8/22/2022 12:58 PM CDT -----  Regarding: FW: Kyleena  Can you see if still wants IUD. Kalpana was planned in May.  ----- Message -----  From: Bridgett Mcmullen MD  Sent: 6/22/2022   6:47 AM CDT  To: Bridgett Mcmullen MD  Subject: Kalpana                                          Auth sent 5/23

## 2022-09-03 DIAGNOSIS — K59.04 CHRONIC IDIOPATHIC CONSTIPATION: Primary | ICD-10-CM

## 2022-09-03 DIAGNOSIS — R19.4 CHANGE IN BOWEL HABITS: ICD-10-CM

## 2022-09-03 DIAGNOSIS — R14.0 ABDOMINAL BLOATING: ICD-10-CM

## 2022-11-30 ENCOUNTER — OFFICE VISIT (OUTPATIENT)
Dept: FAMILY MEDICINE | Facility: CLINIC | Age: 32
End: 2022-11-30
Payer: COMMERCIAL

## 2022-11-30 VITALS
WEIGHT: 135.38 LBS | HEIGHT: 62 IN | OXYGEN SATURATION: 99 % | SYSTOLIC BLOOD PRESSURE: 116 MMHG | DIASTOLIC BLOOD PRESSURE: 80 MMHG | BODY MASS INDEX: 24.91 KG/M2 | HEART RATE: 66 BPM

## 2022-11-30 DIAGNOSIS — Z20.822 EXPOSURE TO COVID-19 VIRUS: ICD-10-CM

## 2022-11-30 DIAGNOSIS — Z00.00 ANNUAL PHYSICAL EXAM: Primary | ICD-10-CM

## 2022-11-30 DIAGNOSIS — Z23 ENCOUNTER FOR IMMUNIZATION: ICD-10-CM

## 2022-11-30 DIAGNOSIS — R53.83 FATIGUE, UNSPECIFIED TYPE: ICD-10-CM

## 2022-11-30 LAB
CTP QC/QA: YES
SARS-COV-2 AG RESP QL IA.RAPID: NEGATIVE

## 2022-11-30 PROCEDURE — 3074F SYST BP LT 130 MM HG: CPT | Mod: CPTII,S$GLB,, | Performed by: FAMILY MEDICINE

## 2022-11-30 PROCEDURE — 1159F MED LIST DOCD IN RCRD: CPT | Mod: CPTII,S$GLB,, | Performed by: FAMILY MEDICINE

## 2022-11-30 PROCEDURE — 90471 FLU VACCINE (QUAD) GREATER THAN OR EQUAL TO 3YO PRESERVATIVE FREE IM: ICD-10-PCS | Mod: S$GLB,,, | Performed by: FAMILY MEDICINE

## 2022-11-30 PROCEDURE — 87811 SARS CORONAVIRUS 2 ANTIGEN POCT, MANUAL READ: ICD-10-PCS | Mod: QW,S$GLB,, | Performed by: FAMILY MEDICINE

## 2022-11-30 PROCEDURE — 99395 PR PREVENTIVE VISIT,EST,18-39: ICD-10-PCS | Mod: 25,,, | Performed by: FAMILY MEDICINE

## 2022-11-30 PROCEDURE — 87811 SARS-COV-2 COVID19 W/OPTIC: CPT | Mod: QW,S$GLB,, | Performed by: FAMILY MEDICINE

## 2022-11-30 PROCEDURE — 90686 IIV4 VACC NO PRSV 0.5 ML IM: CPT | Mod: S$GLB,,, | Performed by: FAMILY MEDICINE

## 2022-11-30 PROCEDURE — 1159F PR MEDICATION LIST DOCUMENTED IN MEDICAL RECORD: ICD-10-PCS | Mod: CPTII,S$GLB,, | Performed by: FAMILY MEDICINE

## 2022-11-30 PROCEDURE — 3008F PR BODY MASS INDEX (BMI) DOCUMENTED: ICD-10-PCS | Mod: CPTII,S$GLB,, | Performed by: FAMILY MEDICINE

## 2022-11-30 PROCEDURE — 3079F DIAST BP 80-89 MM HG: CPT | Mod: CPTII,S$GLB,, | Performed by: FAMILY MEDICINE

## 2022-11-30 PROCEDURE — 3074F PR MOST RECENT SYSTOLIC BLOOD PRESSURE < 130 MM HG: ICD-10-PCS | Mod: CPTII,S$GLB,, | Performed by: FAMILY MEDICINE

## 2022-11-30 PROCEDURE — 90686 FLU VACCINE (QUAD) GREATER THAN OR EQUAL TO 3YO PRESERVATIVE FREE IM: ICD-10-PCS | Mod: S$GLB,,, | Performed by: FAMILY MEDICINE

## 2022-11-30 PROCEDURE — 99999 PR PBB SHADOW E&M-EST. PATIENT-LVL III: ICD-10-PCS | Mod: PBBFAC,,, | Performed by: FAMILY MEDICINE

## 2022-11-30 PROCEDURE — 99395 PREV VISIT EST AGE 18-39: CPT | Mod: 25,,, | Performed by: FAMILY MEDICINE

## 2022-11-30 PROCEDURE — 3079F PR MOST RECENT DIASTOLIC BLOOD PRESSURE 80-89 MM HG: ICD-10-PCS | Mod: CPTII,S$GLB,, | Performed by: FAMILY MEDICINE

## 2022-11-30 PROCEDURE — 99999 PR PBB SHADOW E&M-EST. PATIENT-LVL III: CPT | Mod: PBBFAC,,, | Performed by: FAMILY MEDICINE

## 2022-11-30 PROCEDURE — 3008F BODY MASS INDEX DOCD: CPT | Mod: CPTII,S$GLB,, | Performed by: FAMILY MEDICINE

## 2022-11-30 PROCEDURE — 90471 IMMUNIZATION ADMIN: CPT | Mod: S$GLB,,, | Performed by: FAMILY MEDICINE

## 2022-11-30 RX ORDER — HYDROCODONE BITARTRATE AND ACETAMINOPHEN 7.5; 325 MG/1; MG/1
1 TABLET ORAL EVERY 6 HOURS PRN
COMMUNITY
Start: 2022-11-21 | End: 2023-02-16

## 2022-11-30 RX ORDER — AMOXICILLIN 500 MG/1
CAPSULE ORAL
COMMUNITY
Start: 2022-11-10 | End: 2023-02-16

## 2022-11-30 RX ORDER — TRAMADOL HYDROCHLORIDE 50 MG/1
50 TABLET ORAL EVERY 4 HOURS PRN
COMMUNITY
Start: 2022-11-10 | End: 2023-02-16

## 2022-11-30 NOTE — PROGRESS NOTES
(Portions of this note were dictated using voice recognition software and may contain dictation related errors in spelling/grammar/syntax not found on text review)    CC:   Chief Complaint   Patient presents with    Annual Exam       HPI: 32 y.o. female presented for annual examination as a new patient to me. She has no primary care doctor. She has nonsignificant medical history on file, currently does not take any prescription medications.  Patient follows up with Gynecology, reports gaining weight recently and had blood workup done with gynecologist in May of this year, she has mild vitamin-D insufficiency, does not take vitamin-D supplements.  She wants to have COVID test done, she was with her friend over the weekend who tested positive for COVID this morning, she reports fatigue, denies having any upper respiratory respiratory symptoms.  She does not smoke, has no toxic habits.  She needs flu vaccine.  She denies having any other symptoms or concerns.    Past Medical History:   Diagnosis Date    Attention deficit disorder (ADD)        No past surgical history on file.    Family History   Problem Relation Age of Onset    Hypertension Mother     Stroke Mother     Hypertension Father     Hypothyroidism Father     Hypothyroidism Sister     No Known Problems Maternal Aunt     No Known Problems Maternal Uncle     No Known Problems Paternal Aunt     No Known Problems Paternal Uncle     No Known Problems Maternal Grandfather     No Known Problems Paternal Grandmother     No Known Problems Paternal Grandfather     Celiac disease Neg Hx     Cirrhosis Neg Hx     Colon cancer Neg Hx     Colon polyps Neg Hx     Crohn's disease Neg Hx     Esophageal cancer Neg Hx     Inflammatory bowel disease Neg Hx     Liver cancer Neg Hx     Liver disease Neg Hx     Rectal cancer Neg Hx     Stomach cancer Neg Hx     Ulcerative colitis Neg Hx     Ovarian cancer Neg Hx     Uterine cancer Neg Hx     Kidney cancer Neg Hx     Bladder Cancer  Neg Hx     Pancreatitis Neg Hx     Pancreatic cancer Neg Hx        Social History     Tobacco Use    Smoking status: Never    Smokeless tobacco: Never   Substance Use Topics    Alcohol use: Yes     Comment: social       Lab Results   Component Value Date    WBC 5.10 05/23/2022    HGB 13.9 05/23/2022    HCT 41.8 05/23/2022    MCV 84 05/23/2022     05/23/2022    CHOL 144 05/23/2022    TRIG 39 05/23/2022    HDL 67 05/23/2022    ALT 13 05/23/2022    AST 18 05/23/2022    BILITOT 0.6 05/23/2022    ALKPHOS 44 (L) 05/23/2022     05/23/2022    K 4.1 05/23/2022     05/23/2022    CREATININE 0.8 05/23/2022    ESTGFRAFRICA >60 05/23/2022    EGFRNONAA >60 05/23/2022    CALCIUM 9.3 05/23/2022    ALBUMIN 4.3 05/23/2022    BUN 10 05/23/2022    CO2 22 (L) 05/23/2022    TSH 0.994 05/23/2022    LDLCALC 69.2 05/23/2022    GLU 83 05/23/2022    [id number] 22 (L) 05/23/2022             Vital signs reviewed  PE:   APPEARANCE: Well nourished, well developed, in no acute distress.    HEAD: Normocephalic, atraumatic.  EYES: EOMI.  Conjunctivae noninjected.  NECK: Supple with no cervical lymphadenopathy.    CHEST: Good inspiratory effort. Lungs clear to auscultation with no wheezes or crackles.  CARDIOVASCULAR: Normal S1, S2. No rubs, murmurs, or gallops.  ABDOMEN: Bowel sounds normal. Not distended. Soft. No tenderness or masses. No organomegaly.  EXTREMITIES: No edema, cyanosis, or clubbing.    Review of Systems   Constitutional:  Positive for unexpected weight change. Negative for activity change.   HENT:  Negative for hearing loss, rhinorrhea and trouble swallowing.    Eyes:  Negative for discharge and visual disturbance.   Respiratory:  Negative for chest tightness and wheezing.    Cardiovascular:  Negative for chest pain and palpitations.   Gastrointestinal:  Positive for constipation. Negative for blood in stool, diarrhea and vomiting.   Endocrine: Negative for polydipsia and polyuria.   Genitourinary:  Negative for  difficulty urinating, dysuria, hematuria and menstrual problem.   Musculoskeletal:  Negative for arthralgias, joint swelling and neck pain.   Neurological:  Negative for weakness and headaches.   Psychiatric/Behavioral:  Negative for confusion and dysphoric mood.      IMPRESSION  1. Annual physical exam    2. Fatigue, unspecified type    3. Exposure to COVID-19 virus    4. Encounter for immunization            PLAN      1. Fatigue, unspecified type  - SARS Coronavirus 2 Antigen, POCT Manual Read      2. Exposure to COVID-19 virus    - SARS Coronavirus 2 Antigen, POCT Manual Read      3. Encounter for immunization  - Influenza - Quadrivalent *Preferred* (6 months+) (PF)        4. Annual physical exam    Labs reviewed    Covid negative     Advised to take vitamin D supplements    Healthy diet and exercise      Age/demographic appropriate health maintenance:    Health Maintenance Due   Topic Date Due    COVID-19 Vaccine (3 - Booster for Pfizer series) 03/27/2021             Damion Olmstead   11/30/2022

## 2023-01-11 NOTE — PROGRESS NOTES
"Subjective:      Patient ID: Josefina Boston is a 32 y.o. female.    Chief Complaint:  No chief complaint on file.    History of Present Illness  Josefina Boston is here for evaluation and management of Hypothyroidism.   This is their first visit with me.      With regards to hypothyroidism:    FH of thyroid disease: sister, father   Both have hypothyroidism.     Lab Results   Component Value Date    TSH 0.994 05/23/2022     Current Medication:  None    Biotin Use: Denies     Current Symptoms:   Reports weight gain over the last 2 years, constipation, fatigue, hair loss, palpitations (random but rare)  Denies diarrhea, brittle nails, chest pain, SOB, heat or cold intolerance, easy bruising, kidney stones    No OCPS.  Reports regular menstrual cycles every 30 days - lasts 4 days.   No children and no plans for fertility in the near future.    FH of DM: maternal grandfather     Review of Systems  As above     Objective:   Physical Exam  Vitals reviewed.   Neck:      Thyroid: No thyromegaly (irregular shaped gland).   Cardiovascular:      Rate and Rhythm: Normal rate.      Comments: No edema present  Pulmonary:      Effort: Pulmonary effort is normal.   Abdominal:      Palpations: Abdomen is soft.       Visit Vitals  /70 (BP Location: Right arm, Patient Position: Sitting, BP Method: Medium (Manual))   Pulse 71   Ht 5' 2" (1.575 m)   Wt 63 kg (139 lb)   SpO2 98%   BMI 25.42 kg/m²       Body mass index is 25.42 kg/m².    Lab Review:   No results found for: HGBA1C    Lab Results   Component Value Date    CHOL 144 05/23/2022    HDL 67 05/23/2022    LDLCALC 69.2 05/23/2022    TRIG 39 05/23/2022    CHOLHDL 46.5 05/23/2022     Lab Results   Component Value Date     05/23/2022    K 4.1 05/23/2022     05/23/2022    CO2 22 (L) 05/23/2022    GLU 83 05/23/2022    BUN 10 05/23/2022    CREATININE 0.8 05/23/2022    CALCIUM 9.3 05/23/2022    PROT 7.5 05/23/2022    ALBUMIN 4.3 05/23/2022    BILITOT 0.6 05/23/2022    " ALKPHOS 44 (L) 05/23/2022    AST 18 05/23/2022    ALT 13 05/23/2022    ANIONGAP 10 05/23/2022    ESTGFRAFRICA >60 05/23/2022    EGFRNONAA >60 05/23/2022    TSH 0.994 05/23/2022     Vit D, 25-Hydroxy   Date Value Ref Range Status   05/23/2022 22 (L) 30 - 96 ng/mL Final     Comment:     Vitamin D deficiency.........<10 ng/mL                              Vitamin D insufficiency......10-29 ng/mL       Vitamin D sufficiency........> or equal to 30 ng/mL  Vitamin D toxicity............>100 ng/mL       Assessment and Plan     1. Family history of thyroid disease  Ambulatory referral/consult to Endocrinology    Comprehensive Metabolic Panel    TSH    T4, Free    THYROID PEROXIDASE ANTIBODY    Hemoglobin A1C    Vitamin D    US Soft Tissue Head Neck Thyroid    CANCELED: US Soft Tissue Head Neck Thyroid      2. Weight gain  Ambulatory referral/consult to Endocrinology    Comprehensive Metabolic Panel    TSH    T4, Free    THYROID PEROXIDASE ANTIBODY    Hemoglobin A1C    Vitamin D      3. Thyromegaly  US Soft Tissue Head Neck Thyroid    CANCELED: US Soft Tissue Head Neck Thyroid          Family history of thyroid disease  -- Check TFTs and TPOab.      Follow up if symptoms worsen or fail to improve.

## 2023-01-17 ENCOUNTER — OFFICE VISIT (OUTPATIENT)
Dept: ENDOCRINOLOGY | Facility: CLINIC | Age: 33
End: 2023-01-17
Payer: COMMERCIAL

## 2023-01-17 ENCOUNTER — LAB VISIT (OUTPATIENT)
Dept: LAB | Facility: HOSPITAL | Age: 33
End: 2023-01-17
Payer: COMMERCIAL

## 2023-01-17 ENCOUNTER — PATIENT MESSAGE (OUTPATIENT)
Dept: ENDOCRINOLOGY | Facility: CLINIC | Age: 33
End: 2023-01-17

## 2023-01-17 VITALS
WEIGHT: 139 LBS | BODY MASS INDEX: 25.58 KG/M2 | DIASTOLIC BLOOD PRESSURE: 70 MMHG | HEART RATE: 71 BPM | SYSTOLIC BLOOD PRESSURE: 110 MMHG | OXYGEN SATURATION: 98 % | HEIGHT: 62 IN

## 2023-01-17 DIAGNOSIS — E01.0 THYROMEGALY: ICD-10-CM

## 2023-01-17 DIAGNOSIS — R63.5 WEIGHT GAIN: ICD-10-CM

## 2023-01-17 DIAGNOSIS — R79.89 ELEVATED LIVER FUNCTION TESTS: Primary | ICD-10-CM

## 2023-01-17 DIAGNOSIS — Z83.49 FAMILY HISTORY OF THYROID DISEASE: ICD-10-CM

## 2023-01-17 DIAGNOSIS — Z83.49 FAMILY HISTORY OF THYROID DISEASE: Primary | ICD-10-CM

## 2023-01-17 LAB
25(OH)D3+25(OH)D2 SERPL-MCNC: 25 NG/ML (ref 30–96)
ALBUMIN SERPL BCP-MCNC: 4.4 G/DL (ref 3.5–5.2)
ALP SERPL-CCNC: 53 U/L (ref 55–135)
ALT SERPL W/O P-5'-P-CCNC: 58 U/L (ref 10–44)
ANION GAP SERPL CALC-SCNC: 7 MMOL/L (ref 8–16)
AST SERPL-CCNC: 88 U/L (ref 10–40)
BILIRUB SERPL-MCNC: 0.8 MG/DL (ref 0.1–1)
BUN SERPL-MCNC: 6 MG/DL (ref 6–20)
CALCIUM SERPL-MCNC: 10 MG/DL (ref 8.7–10.5)
CHLORIDE SERPL-SCNC: 103 MMOL/L (ref 95–110)
CO2 SERPL-SCNC: 26 MMOL/L (ref 23–29)
CREAT SERPL-MCNC: 0.8 MG/DL (ref 0.5–1.4)
EST. GFR  (NO RACE VARIABLE): >60 ML/MIN/1.73 M^2
ESTIMATED AVG GLUCOSE: 94 MG/DL (ref 68–131)
GLUCOSE SERPL-MCNC: 87 MG/DL (ref 70–110)
HBA1C MFR BLD: 4.9 % (ref 4–5.6)
POTASSIUM SERPL-SCNC: 4.3 MMOL/L (ref 3.5–5.1)
PROT SERPL-MCNC: 7.8 G/DL (ref 6–8.4)
SODIUM SERPL-SCNC: 136 MMOL/L (ref 136–145)
T4 FREE SERPL-MCNC: 0.96 NG/DL (ref 0.71–1.51)
THYROPEROXIDASE IGG SERPL-ACNC: <6 IU/ML
TSH SERPL DL<=0.005 MIU/L-ACNC: 0.76 UIU/ML (ref 0.4–4)

## 2023-01-17 PROCEDURE — 3008F BODY MASS INDEX DOCD: CPT | Mod: CPTII,S$GLB,, | Performed by: NURSE PRACTITIONER

## 2023-01-17 PROCEDURE — 80053 COMPREHEN METABOLIC PANEL: CPT | Performed by: NURSE PRACTITIONER

## 2023-01-17 PROCEDURE — 99203 PR OFFICE/OUTPT VISIT, NEW, LEVL III, 30-44 MIN: ICD-10-PCS | Mod: S$GLB,,, | Performed by: NURSE PRACTITIONER

## 2023-01-17 PROCEDURE — 3008F PR BODY MASS INDEX (BMI) DOCUMENTED: ICD-10-PCS | Mod: CPTII,S$GLB,, | Performed by: NURSE PRACTITIONER

## 2023-01-17 PROCEDURE — 82306 VITAMIN D 25 HYDROXY: CPT | Performed by: NURSE PRACTITIONER

## 2023-01-17 PROCEDURE — 84439 ASSAY OF FREE THYROXINE: CPT | Performed by: NURSE PRACTITIONER

## 2023-01-17 PROCEDURE — 3078F DIAST BP <80 MM HG: CPT | Mod: CPTII,S$GLB,, | Performed by: NURSE PRACTITIONER

## 2023-01-17 PROCEDURE — 1159F MED LIST DOCD IN RCRD: CPT | Mod: CPTII,S$GLB,, | Performed by: NURSE PRACTITIONER

## 2023-01-17 PROCEDURE — 1159F PR MEDICATION LIST DOCUMENTED IN MEDICAL RECORD: ICD-10-PCS | Mod: CPTII,S$GLB,, | Performed by: NURSE PRACTITIONER

## 2023-01-17 PROCEDURE — 3078F PR MOST RECENT DIASTOLIC BLOOD PRESSURE < 80 MM HG: ICD-10-PCS | Mod: CPTII,S$GLB,, | Performed by: NURSE PRACTITIONER

## 2023-01-17 PROCEDURE — 83036 HEMOGLOBIN GLYCOSYLATED A1C: CPT | Performed by: NURSE PRACTITIONER

## 2023-01-17 PROCEDURE — 3074F SYST BP LT 130 MM HG: CPT | Mod: CPTII,S$GLB,, | Performed by: NURSE PRACTITIONER

## 2023-01-17 PROCEDURE — 1160F RVW MEDS BY RX/DR IN RCRD: CPT | Mod: CPTII,S$GLB,, | Performed by: NURSE PRACTITIONER

## 2023-01-17 PROCEDURE — 86376 MICROSOMAL ANTIBODY EACH: CPT | Performed by: NURSE PRACTITIONER

## 2023-01-17 PROCEDURE — 3074F PR MOST RECENT SYSTOLIC BLOOD PRESSURE < 130 MM HG: ICD-10-PCS | Mod: CPTII,S$GLB,, | Performed by: NURSE PRACTITIONER

## 2023-01-17 PROCEDURE — 99203 OFFICE O/P NEW LOW 30 MIN: CPT | Mod: S$GLB,,, | Performed by: NURSE PRACTITIONER

## 2023-01-17 PROCEDURE — 99999 PR PBB SHADOW E&M-EST. PATIENT-LVL IV: ICD-10-PCS | Mod: PBBFAC,,, | Performed by: NURSE PRACTITIONER

## 2023-01-17 PROCEDURE — 1160F PR REVIEW ALL MEDS BY PRESCRIBER/CLIN PHARMACIST DOCUMENTED: ICD-10-PCS | Mod: CPTII,S$GLB,, | Performed by: NURSE PRACTITIONER

## 2023-01-17 PROCEDURE — 99999 PR PBB SHADOW E&M-EST. PATIENT-LVL IV: CPT | Mod: PBBFAC,,, | Performed by: NURSE PRACTITIONER

## 2023-01-17 PROCEDURE — 84443 ASSAY THYROID STIM HORMONE: CPT | Performed by: NURSE PRACTITIONER

## 2023-01-17 PROCEDURE — 36415 COLL VENOUS BLD VENIPUNCTURE: CPT | Performed by: NURSE PRACTITIONER

## 2023-01-18 ENCOUNTER — TELEPHONE (OUTPATIENT)
Dept: ENDOCRINOLOGY | Facility: CLINIC | Age: 33
End: 2023-01-18
Payer: COMMERCIAL

## 2023-01-18 NOTE — TELEPHONE ENCOUNTER
Component      Latest Ref Rng & Units 1/17/2023   Sodium      136 - 145 mmol/L 136   Potassium      3.5 - 5.1 mmol/L 4.3   Chloride      95 - 110 mmol/L 103   CO2      23 - 29 mmol/L 26   Glucose      70 - 110 mg/dL 87   BUN      6 - 20 mg/dL 6   Creatinine      0.5 - 1.4 mg/dL 0.8   Calcium      8.7 - 10.5 mg/dL 10.0   PROTEIN TOTAL      6.0 - 8.4 g/dL 7.8   Albumin      3.5 - 5.2 g/dL 4.4   BILIRUBIN TOTAL      0.1 - 1.0 mg/dL 0.8   Alkaline Phosphatase      55 - 135 U/L 53 (L)   AST      10 - 40 U/L 88 (H)   ALT      10 - 44 U/L 58 (H)   Anion Gap      8 - 16 mmol/L 7 (L)   eGFR      >60 mL/min/1.73 m:2 >60.0   Hemoglobin A1C External      4.0 - 5.6 % 4.9   Estimated Avg Glucose      68 - 131 mg/dL 94   TSH      0.400 - 4.000 uIU/mL 0.761   Free T4      0.71 - 1.51 ng/dL 0.96   Thyroperoxidase Antibodies      <6.0 IU/mL <6.0   Vit D, 25-Hydroxy      30 - 96 ng/mL 25 (L)

## 2023-01-25 ENCOUNTER — PATIENT MESSAGE (OUTPATIENT)
Dept: GASTROENTEROLOGY | Facility: CLINIC | Age: 33
End: 2023-01-25
Payer: COMMERCIAL

## 2023-01-31 ENCOUNTER — LAB VISIT (OUTPATIENT)
Dept: LAB | Facility: HOSPITAL | Age: 33
End: 2023-01-31
Payer: COMMERCIAL

## 2023-01-31 DIAGNOSIS — R79.89 ELEVATED LIVER FUNCTION TESTS: ICD-10-CM

## 2023-01-31 LAB
ALBUMIN SERPL BCP-MCNC: 4.2 G/DL (ref 3.5–5.2)
ALP SERPL-CCNC: 44 U/L (ref 55–135)
ALT SERPL W/O P-5'-P-CCNC: 18 U/L (ref 10–44)
ANION GAP SERPL CALC-SCNC: 5 MMOL/L (ref 8–16)
AST SERPL-CCNC: 18 U/L (ref 10–40)
BILIRUB SERPL-MCNC: 0.6 MG/DL (ref 0.1–1)
BUN SERPL-MCNC: 10 MG/DL (ref 6–20)
CALCIUM SERPL-MCNC: 9.6 MG/DL (ref 8.7–10.5)
CHLORIDE SERPL-SCNC: 105 MMOL/L (ref 95–110)
CO2 SERPL-SCNC: 25 MMOL/L (ref 23–29)
CREAT SERPL-MCNC: 0.8 MG/DL (ref 0.5–1.4)
EST. GFR  (NO RACE VARIABLE): >60 ML/MIN/1.73 M^2
GLUCOSE SERPL-MCNC: 105 MG/DL (ref 70–110)
POTASSIUM SERPL-SCNC: 4.5 MMOL/L (ref 3.5–5.1)
PROT SERPL-MCNC: 7.3 G/DL (ref 6–8.4)
SODIUM SERPL-SCNC: 135 MMOL/L (ref 136–145)

## 2023-01-31 PROCEDURE — 80053 COMPREHEN METABOLIC PANEL: CPT | Performed by: NURSE PRACTITIONER

## 2023-01-31 PROCEDURE — 36415 COLL VENOUS BLD VENIPUNCTURE: CPT | Performed by: NURSE PRACTITIONER

## 2023-02-16 ENCOUNTER — OFFICE VISIT (OUTPATIENT)
Dept: URGENT CARE | Facility: CLINIC | Age: 33
End: 2023-02-16
Payer: COMMERCIAL

## 2023-02-16 VITALS
HEART RATE: 60 BPM | OXYGEN SATURATION: 99 % | RESPIRATION RATE: 16 BRPM | HEIGHT: 62 IN | DIASTOLIC BLOOD PRESSURE: 84 MMHG | WEIGHT: 138 LBS | SYSTOLIC BLOOD PRESSURE: 140 MMHG | BODY MASS INDEX: 25.4 KG/M2 | TEMPERATURE: 98 F

## 2023-02-16 DIAGNOSIS — J02.9 SORE THROAT: Primary | ICD-10-CM

## 2023-02-16 LAB
CTP QC/QA: YES
MOLECULAR STREP A: NEGATIVE
POC MOLECULAR INFLUENZA A AGN: NEGATIVE
POC MOLECULAR INFLUENZA B AGN: NEGATIVE
SARS-COV-2 RDRP RESP QL NAA+PROBE: NEGATIVE

## 2023-02-16 PROCEDURE — 3079F DIAST BP 80-89 MM HG: CPT | Mod: CPTII,S$GLB,, | Performed by: FAMILY MEDICINE

## 2023-02-16 PROCEDURE — 3079F PR MOST RECENT DIASTOLIC BLOOD PRESSURE 80-89 MM HG: ICD-10-PCS | Mod: CPTII,S$GLB,, | Performed by: FAMILY MEDICINE

## 2023-02-16 PROCEDURE — 3008F BODY MASS INDEX DOCD: CPT | Mod: CPTII,S$GLB,, | Performed by: FAMILY MEDICINE

## 2023-02-16 PROCEDURE — 1159F MED LIST DOCD IN RCRD: CPT | Mod: CPTII,S$GLB,, | Performed by: FAMILY MEDICINE

## 2023-02-16 PROCEDURE — 99213 OFFICE O/P EST LOW 20 MIN: CPT | Mod: S$GLB,,, | Performed by: FAMILY MEDICINE

## 2023-02-16 PROCEDURE — 87502 POCT INFLUENZA A/B MOLECULAR: ICD-10-PCS | Mod: QW,S$GLB,, | Performed by: FAMILY MEDICINE

## 2023-02-16 PROCEDURE — 87635: ICD-10-PCS | Mod: QW,S$GLB,, | Performed by: FAMILY MEDICINE

## 2023-02-16 PROCEDURE — 3044F PR MOST RECENT HEMOGLOBIN A1C LEVEL <7.0%: ICD-10-PCS | Mod: CPTII,S$GLB,, | Performed by: FAMILY MEDICINE

## 2023-02-16 PROCEDURE — 99213 PR OFFICE/OUTPT VISIT, EST, LEVL III, 20-29 MIN: ICD-10-PCS | Mod: S$GLB,,, | Performed by: FAMILY MEDICINE

## 2023-02-16 PROCEDURE — 3077F SYST BP >= 140 MM HG: CPT | Mod: CPTII,S$GLB,, | Performed by: FAMILY MEDICINE

## 2023-02-16 PROCEDURE — 87651 POCT STREP A MOLECULAR: ICD-10-PCS | Mod: QW,S$GLB,, | Performed by: FAMILY MEDICINE

## 2023-02-16 PROCEDURE — 3077F PR MOST RECENT SYSTOLIC BLOOD PRESSURE >= 140 MM HG: ICD-10-PCS | Mod: CPTII,S$GLB,, | Performed by: FAMILY MEDICINE

## 2023-02-16 PROCEDURE — 1159F PR MEDICATION LIST DOCUMENTED IN MEDICAL RECORD: ICD-10-PCS | Mod: CPTII,S$GLB,, | Performed by: FAMILY MEDICINE

## 2023-02-16 PROCEDURE — 87635 SARS-COV-2 COVID-19 AMP PRB: CPT | Mod: QW,S$GLB,, | Performed by: FAMILY MEDICINE

## 2023-02-16 PROCEDURE — 87651 STREP A DNA AMP PROBE: CPT | Mod: QW,S$GLB,, | Performed by: FAMILY MEDICINE

## 2023-02-16 PROCEDURE — 3008F PR BODY MASS INDEX (BMI) DOCUMENTED: ICD-10-PCS | Mod: CPTII,S$GLB,, | Performed by: FAMILY MEDICINE

## 2023-02-16 PROCEDURE — 87502 INFLUENZA DNA AMP PROBE: CPT | Mod: QW,S$GLB,, | Performed by: FAMILY MEDICINE

## 2023-02-16 PROCEDURE — 3044F HG A1C LEVEL LT 7.0%: CPT | Mod: CPTII,S$GLB,, | Performed by: FAMILY MEDICINE

## 2023-02-16 RX ORDER — AMOXICILLIN 500 MG/1
500 TABLET, FILM COATED ORAL EVERY 12 HOURS
Qty: 20 TABLET | Refills: 0 | Status: SHIPPED | OUTPATIENT
Start: 2023-02-16 | End: 2023-02-26

## 2023-02-16 RX ORDER — IBUPROFEN 600 MG/1
600 TABLET ORAL EVERY 8 HOURS PRN
Qty: 30 TABLET | Refills: 1 | Status: SHIPPED | OUTPATIENT
Start: 2023-02-16 | End: 2023-07-27

## 2023-02-16 RX ORDER — PREDNISONE 20 MG/1
40 TABLET ORAL DAILY
Qty: 10 TABLET | Refills: 0 | Status: SHIPPED | OUTPATIENT
Start: 2023-02-16 | End: 2023-02-21

## 2023-02-16 NOTE — PROGRESS NOTES
"Subjective:       Patient ID: Josefina Boston is a 32 y.o. female.    Vitals:  height is 5' 2" (1.575 m) and weight is 62.6 kg (138 lb). Her temperature is 98 °F (36.7 °C). Her blood pressure is 140/84 (abnormal) and her pulse is 60. Her respiration is 16 and oxygen saturation is 99%.     Chief Complaint: Sore Throat    Sore Throat   This is a new problem. Associated symptoms include congestion and coughing. Pertinent negatives include no diarrhea, ear pain, headaches, trouble swallowing or vomiting. Associated symptoms comments: Muscle ache, chils. She has had no exposure to strep or mono. Treatments tried: dayquil/ coffee. The treatment provided mild relief.     HENT:  Positive for congestion and sore throat. Negative for ear pain and trouble swallowing.    Respiratory:  Positive for cough.    Gastrointestinal:  Negative for vomiting and diarrhea.   Neurological:  Negative for headaches.     Objective:      Physical Exam   Constitutional:  Non-toxic appearance. She does not appear ill. No distress.   HENT:   Head: Normocephalic and atraumatic.   Nose: Nose normal.   Mouth/Throat: Posterior oropharyngeal erythema present. No oropharyngeal exudate.   Eyes: Conjunctivae are normal. Right eye exhibits no discharge. Left eye exhibits no discharge.   Cardiovascular: Normal rate and regular rhythm.   No murmur heard.Exam reveals no gallop and no friction rub.   Pulmonary/Chest: Effort normal and breath sounds normal.   Abdominal: Normal appearance.   Musculoskeletal:         General: No swelling.   Neurological: She is alert.   Skin: Skin is not diaphoretic.   Psychiatric: Her behavior is normal. Mood, judgment and thought content normal.       Assessment:       1. Sore throat          Plan:         Sore throat  -     POCT COVID-19 Rapid Screening  -     POCT Strep A, Molecular  -     POCT Influenza A/B MOLECULAR    Other orders  -     ibuprofen (ADVIL,MOTRIN) 600 MG tablet; Take 1 tablet (600 mg total) by mouth every 8 " (eight) hours as needed for Pain.  Dispense: 30 tablet; Refill: 1  -     predniSONE (DELTASONE) 20 MG tablet; Take 2 tablets (40 mg total) by mouth once daily. for 5 days  Dispense: 10 tablet; Refill: 0  -     amoxicillin (AMOXIL) 500 MG Tab; Take 1 tablet (500 mg total) by mouth every 12 (twelve) hours. for 10 days  Dispense: 20 tablet; Refill: 0

## 2023-04-02 RX ORDER — FLUCONAZOLE 150 MG/1
150 TABLET ORAL DAILY
Qty: 3 TABLET | Refills: 3 | Status: SHIPPED | OUTPATIENT
Start: 2023-04-02 | End: 2023-04-05

## 2023-05-05 ENCOUNTER — TELEPHONE (OUTPATIENT)
Dept: ENDOSCOPY | Facility: HOSPITAL | Age: 33
End: 2023-05-05
Payer: COMMERCIAL

## 2023-05-09 ENCOUNTER — TELEPHONE (OUTPATIENT)
Dept: ENDOSCOPY | Facility: HOSPITAL | Age: 33
End: 2023-05-09
Payer: COMMERCIAL

## 2023-05-09 ENCOUNTER — PATIENT MESSAGE (OUTPATIENT)
Dept: ENDOSCOPY | Facility: HOSPITAL | Age: 33
End: 2023-05-09
Payer: COMMERCIAL

## 2023-05-09 NOTE — TELEPHONE ENCOUNTER
"Called pt to schedule procedure, no answer. Left voicemail for pt to call the Endoscopy Scheduling Dept. [phone]      Procedure: Colonoscopy     Diagnosis: Constipation     Procedure Timing: 3-4 weeks     *If within 4 weeks selected, please kiran as high priority*     *If greater than 12 weeks, please select "5-12 weeks" and delay sending until 2 months prior to requested date*     Provider: Myself     Location: [address]     Additional Scheduling Information: No scheduling concerns     Prep Specifications:Extended/Constipation prep     Have you attached a patient to this message: yes   "

## 2023-05-16 ENCOUNTER — TELEPHONE (OUTPATIENT)
Dept: ENDOSCOPY | Facility: HOSPITAL | Age: 33
End: 2023-05-16
Payer: COMMERCIAL

## 2023-05-16 NOTE — TELEPHONE ENCOUNTER
"May 5, 2023  Larry Vides MD  to [facility]           5:42 PM   Procedure: Colonoscopy     Diagnosis: Constipation     Procedure Timing: 3-4 weeks     *If within 4 weeks selected, please kiran as high priority*     *If greater than 12 weeks, please select "5-12 weeks" and delay sending until 2 months prior to requested date*     Provider: Myself     Location: [address]     Additional Scheduling Information: No scheduling concerns     Prep Specifications:Extended/Constipation prep     Have you attached a patient to this message: yes   "

## 2023-05-16 NOTE — TELEPHONE ENCOUNTER
Called patient to schedule for a colonoscopy. No answer. Left message for patient to call Endoscopy Scheduling to schedule. Main endoscopy scheduling number provided.

## 2023-05-29 ENCOUNTER — TELEPHONE (OUTPATIENT)
Dept: ENDOSCOPY | Facility: HOSPITAL | Age: 33
End: 2023-05-29
Payer: COMMERCIAL

## 2023-05-29 ENCOUNTER — PATIENT MESSAGE (OUTPATIENT)
Dept: GASTROENTEROLOGY | Facility: CLINIC | Age: 33
End: 2023-05-29
Payer: COMMERCIAL

## 2023-05-29 NOTE — TELEPHONE ENCOUNTER
FYI Multiple attempts made to contact Patient to schedule procedure(s). Patient did not schedule. Per protocol no further attempts to contact Patient will be made regarding this request

## 2023-07-27 ENCOUNTER — OFFICE VISIT (OUTPATIENT)
Dept: PRIMARY CARE CLINIC | Facility: CLINIC | Age: 33
End: 2023-07-27
Payer: COMMERCIAL

## 2023-07-27 ENCOUNTER — LAB VISIT (OUTPATIENT)
Dept: LAB | Facility: HOSPITAL | Age: 33
End: 2023-07-27
Payer: COMMERCIAL

## 2023-07-27 ENCOUNTER — TELEPHONE (OUTPATIENT)
Dept: PRIMARY CARE CLINIC | Facility: CLINIC | Age: 33
End: 2023-07-27

## 2023-07-27 VITALS
BODY MASS INDEX: 24.46 KG/M2 | WEIGHT: 132.94 LBS | TEMPERATURE: 98 F | DIASTOLIC BLOOD PRESSURE: 82 MMHG | OXYGEN SATURATION: 99 % | HEIGHT: 62 IN | SYSTOLIC BLOOD PRESSURE: 118 MMHG | HEART RATE: 67 BPM

## 2023-07-27 DIAGNOSIS — Z76.89 ENCOUNTER TO ESTABLISH CARE: ICD-10-CM

## 2023-07-27 DIAGNOSIS — R20.2 PARESTHESIAS: ICD-10-CM

## 2023-07-27 DIAGNOSIS — Z76.89 ENCOUNTER TO ESTABLISH CARE: Primary | ICD-10-CM

## 2023-07-27 LAB
ALBUMIN SERPL BCP-MCNC: 4.7 G/DL (ref 3.5–5.2)
ALP SERPL-CCNC: 48 U/L (ref 55–135)
ALT SERPL W/O P-5'-P-CCNC: 15 U/L (ref 10–44)
ANION GAP SERPL CALC-SCNC: 11 MMOL/L (ref 8–16)
AST SERPL-CCNC: 20 U/L (ref 10–40)
BASOPHILS # BLD AUTO: 0.04 K/UL (ref 0–0.2)
BASOPHILS NFR BLD: 0.8 % (ref 0–1.9)
BILIRUB SERPL-MCNC: 1.1 MG/DL (ref 0.1–1)
BILIRUB UR QL STRIP: NEGATIVE
BUN SERPL-MCNC: 6 MG/DL (ref 6–20)
CALCIUM SERPL-MCNC: 9.6 MG/DL (ref 8.7–10.5)
CHLORIDE SERPL-SCNC: 104 MMOL/L (ref 95–110)
CHOLEST SERPL-MCNC: 170 MG/DL (ref 120–199)
CHOLEST/HDLC SERPL: 2.4 {RATIO} (ref 2–5)
CLARITY UR REFRACT.AUTO: CLEAR
CO2 SERPL-SCNC: 23 MMOL/L (ref 23–29)
COLOR UR AUTO: YELLOW
CREAT SERPL-MCNC: 0.7 MG/DL (ref 0.5–1.4)
CRP SERPL-MCNC: 0.6 MG/L (ref 0–8.2)
DIFFERENTIAL METHOD: ABNORMAL
EOSINOPHIL # BLD AUTO: 0.1 K/UL (ref 0–0.5)
EOSINOPHIL NFR BLD: 1.1 % (ref 0–8)
ERYTHROCYTE [DISTWIDTH] IN BLOOD BY AUTOMATED COUNT: 13.2 % (ref 11.5–14.5)
ERYTHROCYTE [SEDIMENTATION RATE] IN BLOOD BY PHOTOMETRIC METHOD: <2 MM/HR (ref 0–36)
EST. GFR  (NO RACE VARIABLE): >60 ML/MIN/1.73 M^2
ESTIMATED AVG GLUCOSE: 91 MG/DL (ref 68–131)
FERRITIN SERPL-MCNC: 97 NG/ML (ref 20–300)
FOLATE SERPL-MCNC: 5.9 NG/ML (ref 4–24)
GLUCOSE SERPL-MCNC: 95 MG/DL (ref 70–110)
GLUCOSE UR QL STRIP: NEGATIVE
HBA1C MFR BLD: 4.8 % (ref 4–5.6)
HCT VFR BLD AUTO: 44.1 % (ref 37–48.5)
HDLC SERPL-MCNC: 70 MG/DL (ref 40–75)
HDLC SERPL: 41.2 % (ref 20–50)
HGB BLD-MCNC: 13.7 G/DL (ref 12–16)
HGB UR QL STRIP: NEGATIVE
IMM GRANULOCYTES # BLD AUTO: 0.02 K/UL (ref 0–0.04)
IMM GRANULOCYTES NFR BLD AUTO: 0.4 % (ref 0–0.5)
KETONES UR QL STRIP: NEGATIVE
LDLC SERPL CALC-MCNC: 91.2 MG/DL (ref 63–159)
LEUKOCYTE ESTERASE UR QL STRIP: ABNORMAL
LYMPHOCYTES # BLD AUTO: 1.9 K/UL (ref 1–4.8)
LYMPHOCYTES NFR BLD: 35.8 % (ref 18–48)
MAGNESIUM SERPL-MCNC: 2 MG/DL (ref 1.6–2.6)
MCH RBC QN AUTO: 26.3 PG (ref 27–31)
MCHC RBC AUTO-ENTMCNC: 31.1 G/DL (ref 32–36)
MCV RBC AUTO: 85 FL (ref 82–98)
MICROSCOPIC COMMENT: NORMAL
MONOCYTES # BLD AUTO: 0.5 K/UL (ref 0.3–1)
MONOCYTES NFR BLD: 9.1 % (ref 4–15)
NEUTROPHILS # BLD AUTO: 2.8 K/UL (ref 1.8–7.7)
NEUTROPHILS NFR BLD: 52.8 % (ref 38–73)
NITRITE UR QL STRIP: NEGATIVE
NONHDLC SERPL-MCNC: 100 MG/DL
NRBC BLD-RTO: 0 /100 WBC
PH UR STRIP: 7 [PH] (ref 5–8)
PLATELET # BLD AUTO: 236 K/UL (ref 150–450)
PMV BLD AUTO: 10.7 FL (ref 9.2–12.9)
POTASSIUM SERPL-SCNC: 4.3 MMOL/L (ref 3.5–5.1)
PROT SERPL-MCNC: 8 G/DL (ref 6–8.4)
PROT UR QL STRIP: NEGATIVE
RBC # BLD AUTO: 5.21 M/UL (ref 4–5.4)
RBC #/AREA URNS AUTO: 0 /HPF (ref 0–4)
SODIUM SERPL-SCNC: 138 MMOL/L (ref 136–145)
SP GR UR STRIP: 1.01 (ref 1–1.03)
SQUAMOUS #/AREA URNS AUTO: 3 /HPF
TRIGL SERPL-MCNC: 44 MG/DL (ref 30–150)
URN SPEC COLLECT METH UR: ABNORMAL
VIT B12 SERPL-MCNC: 329 PG/ML (ref 210–950)
WBC # BLD AUTO: 5.28 K/UL (ref 3.9–12.7)
WBC #/AREA URNS AUTO: 0 /HPF (ref 0–5)

## 2023-07-27 PROCEDURE — 83735 ASSAY OF MAGNESIUM: CPT | Performed by: INTERNAL MEDICINE

## 2023-07-27 PROCEDURE — 85025 COMPLETE CBC W/AUTO DIFF WBC: CPT | Performed by: INTERNAL MEDICINE

## 2023-07-27 PROCEDURE — 85652 RBC SED RATE AUTOMATED: CPT | Performed by: INTERNAL MEDICINE

## 2023-07-27 PROCEDURE — 83036 HEMOGLOBIN GLYCOSYLATED A1C: CPT | Performed by: INTERNAL MEDICINE

## 2023-07-27 PROCEDURE — 82728 ASSAY OF FERRITIN: CPT | Performed by: INTERNAL MEDICINE

## 2023-07-27 PROCEDURE — 86140 C-REACTIVE PROTEIN: CPT | Performed by: INTERNAL MEDICINE

## 2023-07-27 PROCEDURE — 99999 PR PBB SHADOW E&M-EST. PATIENT-LVL IV: CPT | Mod: PBBFAC,,, | Performed by: INTERNAL MEDICINE

## 2023-07-27 PROCEDURE — 82607 VITAMIN B-12: CPT | Performed by: INTERNAL MEDICINE

## 2023-07-27 PROCEDURE — 80053 COMPREHEN METABOLIC PANEL: CPT | Performed by: INTERNAL MEDICINE

## 2023-07-27 PROCEDURE — 99499 NO LOS: ICD-10-PCS | Mod: S$GLB,,, | Performed by: INTERNAL MEDICINE

## 2023-07-27 PROCEDURE — 82746 ASSAY OF FOLIC ACID SERUM: CPT | Performed by: INTERNAL MEDICINE

## 2023-07-27 PROCEDURE — 81001 URINALYSIS AUTO W/SCOPE: CPT | Performed by: INTERNAL MEDICINE

## 2023-07-27 PROCEDURE — 36415 COLL VENOUS BLD VENIPUNCTURE: CPT | Performed by: INTERNAL MEDICINE

## 2023-07-27 PROCEDURE — 99999 PR PBB SHADOW E&M-EST. PATIENT-LVL IV: ICD-10-PCS | Mod: PBBFAC,,, | Performed by: INTERNAL MEDICINE

## 2023-07-27 PROCEDURE — 99499 UNLISTED E&M SERVICE: CPT | Mod: S$GLB,,, | Performed by: INTERNAL MEDICINE

## 2023-07-27 PROCEDURE — 80061 LIPID PANEL: CPT | Performed by: INTERNAL MEDICINE

## 2023-07-27 RX ORDER — BUSPIRONE HYDROCHLORIDE 5 MG/1
5 TABLET ORAL 2 TIMES DAILY PRN
Qty: 60 TABLET | Refills: 0 | Status: SHIPPED | OUTPATIENT
Start: 2023-07-27 | End: 2023-07-31

## 2023-07-27 NOTE — TELEPHONE ENCOUNTER
Reviewed lab with patient.  Other than some non clinically significant abnormality of the [facility] and [facility] end cbc patient's labs are normal and there is no suggestion of an inflammatory process.  Patient notes she went for a massage in the masseuse felt that there was a knot on that side that was compressing and the patient states that when she pressed in that area it got transiently worse than better.  Patient will start Alleve 2 tablets with breakfast and dinner for 5 days and continue with other recommendations such as a diary.  Patient will contact me at the end of next week with an update.

## 2023-07-27 NOTE — PATIENT INSTRUCTIONS
Thank you for coming to visit today.  Despite careful history and physical no source was found for your symptoms of numbness of your arm and face.  As I explained to you this is not typical of anxiety.  I also do not believe this is an acute neurologic emergency requiring immediate CT scan to rule out a vascular cause in the usual sense.  I would like to start and have ordered basic laboratory for you as well as some special tests.  Please remember that my phone number will come back as no caller ID when I call and I will call to discuss the results.  If these symptoms persist then he most likely will need an MRI of her brain and neck.  Please keep a diary of your symptoms; how long they last, the location, whether they wake you from sleep, whether they are associated with weakness, whether or not they change with the weather or hot shower.  Follow-up 1 month

## 2023-07-27 NOTE — PROGRESS NOTES
32-year-old woman without chronic medical problems, on no medications, here to establish.  The patient also complains of left arm and face dysesthesia.    Pertinent review of systems:  Encounter for physical exam:  The patient's last physical exam was 1 year ago.  The patient has a very healthy lifestyle.  Up until a month ago she had no health concerns.  Patient's last lab was in January of 2023.    Dysesthesia:  Symptoms have been intermittent throughout the day but constant in that they have occurred almost every day for the past month.  The patient notes that she is had increasing anxiety over the month due to a break-up of relationship.  She also notes that this particular week when it has worsened is the anniversary of her mother's passing.  She notes that she wakes up in the symptoms are present but they do not wake from sleep.  She does sleep on her side alternating 1 side to the other.  The patient has no neck injury, neck stiffness, or any neck pain.  She has no shoulder or arm pain.  The patient has had a history of migrainous headaches but these have been remote.  The patient describes with these headaches photophobia.  The patient has a history of anxiety and she does note that she tends to have physical symptoms when stressed.  The patient is right-handed not left-handed.  The symptoms seemed to improve with yoga and sleep.  The symptoms worsen with being tired.  As noted the patient has no stiff neck, photophobia, issues with speech, walking, nor does she have any associated weakness or rash.  The patient has no toothache.  She is had no fever, chills, weight loss or change in appetite.  She is had no swelling of her joints.  She is had no visual loss or pain.  She is not sure if her symptoms have worsened with the heat the past month in his not noticed a change if she takes a hot shower.  The patient also has noted no change with her menses.  The only family history of neurologic disease in her  mother who suffered a stroke at age 55.    Past medical history:  Medical:  The patient has had no medical hospitalizations.    Surgical:  The patient has had no surgical procedures.    Trauma:  The patient has had no fractures or dislocations.    Psych:  The patient has been evaluated for ADD at age 18.  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  A diagnosis of ADD was made with formal neuropsych evaluation.  The patient does note that she is had episodes of anxiety and depression and had been on Lexapro 1 time  OBGYN:  Menses began at age 14.  She has never been pregnant.  Her periods are regular and she uses tampons.  Currently the patient is not using any birth control.  Her last Pap was 1 year ago.  Prevention:  The patient has had her COVID vaccinations, flu vaccination, and hepatitis-B was 10 years ago.  The patient never went away college so it is uncertain whether she had a meningococcal vaccine.      Family history:  Depression:  Mother   ADHD:  Father and sister   Hypothyroidism:  Father and sister   Hypertension:  Parents.    CVA:  Mother had a CVA and  at age 55.  It was thought to be secondary to hypertension.  Siblings:  The patient has 1 sister who is alive and in good health    Social history:  Tobacco:  The patient does not smoke.    Alcohol:  The patient has 2-3 glasses of wine on a weekend day.    Recreational drugs:  Patient uses no recreational drugs.    Bowels:  Patient moves her bowels daily   Diet:  The patient consumes 3 meals a day and has 1 caffeinated beverage per day.    Sleep:  Patient sleeps 8-10 hours.    Exercise:  The patient exercises 3 times per week.    Social circumstances:  The patient is single and lives with a roommate.  She is a nurse in the surgical ICU.  She is sexually active with men.  The patient is a child divorce.  The patient hobbies and ways in which she enjoys herself includes exercising, travel, and shopping.    Allergies:  Patient  has no known drug allergies.      Medications:  The patient currently is on no prescribed drugs.      Review of systems:  A 12 system review was inquired.  The patient wears glasses.  She has situational anxiety as noted HPI.  Remainder review of systems is negative      Physical Exam  Vitals and nursing note reviewed.   Constitutional:       General: She is not in acute distress.     Appearance: Normal appearance. She is normal weight. She is not ill-appearing.   HENT:      Head: Atraumatic.      Comments: Temporal arteries are without tenderness.  Normal temporal pulse     Right Ear: Tympanic membrane and ear canal normal.      Left Ear: Tympanic membrane and ear canal normal.      Nose: Nose normal. No congestion or rhinorrhea.      Mouth/Throat:      Mouth: Mucous membranes are moist.      Pharynx: Oropharynx is clear. No oropharyngeal exudate or posterior oropharyngeal erythema.      Comments: Patient has normal dentition  Eyes:      General: No scleral icterus.     Extraocular Movements: Extraocular movements intact.      Conjunctiva/sclera: Conjunctivae normal.      Pupils: Pupils are equal, round, and reactive to light.      Comments: Fundi without exudate hemorrhage or scar.  Vessels are normal.   Neck:      Vascular: No carotid bruit.   Cardiovascular:      Rate and Rhythm: Normal rate and regular rhythm.      Pulses: Normal pulses.      Heart sounds: No murmur heard.    No gallop.   Pulmonary:      Effort: Pulmonary effort is normal. No respiratory distress.      Breath sounds: Normal breath sounds. No wheezing, rhonchi or rales.   Abdominal:      General: Abdomen is flat. Bowel sounds are normal. There is no distension.      Palpations: Abdomen is soft. There is no mass.      Tenderness: There is no abdominal tenderness.      Comments: No hepatosplenomegaly   Musculoskeletal:         General: No swelling or tenderness. Normal range of motion.      Cervical back: Neck supple. No rigidity or tenderness.       Right lower leg: No edema.      Left lower leg: No edema.   Lymphadenopathy:      Cervical: No cervical adenopathy.   Skin:     General: Skin is warm.      Coloration: Skin is not jaundiced.      Findings: No bruising or rash.   Neurological:      General: No focal deficit present.      Mental Status: She is alert and oriented to person, place, and time.      Cranial Nerves: No cranial nerve deficit.      Sensory: No sensory deficit.      Motor: No weakness.      Gait: Gait normal.      Deep Tendon Reflexes: Reflexes normal.      Comments: The patient did note possibly some mild does is between sensation this on left and right to light touch   Psychiatric:         Mood and Affect: Mood normal.         Behavior: Behavior normal.         Thought Content: Thought content normal.      Assessment/plan:  Encounter for physical exam:  The patient has no regular primary however she did have a physical exam a year ago.  The patient takes care of herself in his generally healthy.  Wrist the patient is her situational anxiety.  She is had laboratory last in January of 2023 but it was not a complete set of laboratories but said focused on thyroid.  Plan:  Reviewed above with patient's.  Routine lab will be done today but more so because of her complaints.    I will call the patient with her labs.  I made her aware that when I call the phone will read no caller ID.      Dysesthesia:  The patient came in because of her current [employer] for dysesthesia of her left arm.  It is interesting that it was associated with no pain, no neck stiffness, no shoulder issues.  It is also unilateral which would likely exclude anxiety.  Similarly the lack of pain would exclude in most instances a pinched nerve.  Occasionally tooth aches can give paresthesias into the upper arm neck and jaw area however her dentition was normal.  The patient it should be noted is right-handed.  Symptoms are relieved with rest and they do not wake her from sleep.   The symptoms are also relieved by yoga.  This also is not typical for anxiety.  There is no evidence of a vasculitis on exam.  Also her neurologic exam is essentially normal except for some mild difference in sensation between left and right but could not be quantitated.  Though the patient notes increased anxiety in the last month there is also been a significant heat wave and the history of symptoms in space and time with variation is very suggestive possibility of MS.  Migraine headache without the headache also call migraine accompaniment syndrome should be considered as this can produce toby dysesthesia as the patient describes.  Plan:  Explanation the above  Routine laboratory + sed rate, C-reactive protein, B12, folate, magnesium.    Diary symptoms noting location, length they last, with a weight from sleep, whether whether or hot showers may influence.  BuSpar 5 mg b.i.d. p.r.n. for symptoms  I would like to see the patient in 1 month.    If sed rate and C reactive protein are elevated or the symptoms do not resolve MRI of the neck and brain would be indicated

## 2023-07-28 ENCOUNTER — PATIENT MESSAGE (OUTPATIENT)
Dept: PRIMARY CARE CLINIC | Facility: CLINIC | Age: 33
End: 2023-07-28
Payer: COMMERCIAL

## 2023-07-30 DIAGNOSIS — R20.2 PARESTHESIAS: Primary | ICD-10-CM

## 2023-07-30 NOTE — PROGRESS NOTES
Returned patient's portal message via phone.  The patient did not answer but I did leave a message to ascertain current symptoms.  Neurology consult is appropriate based on her presentation or possibly the need for an MRI.  MRI placed.

## 2023-07-31 ENCOUNTER — PATIENT MESSAGE (OUTPATIENT)
Dept: PRIMARY CARE CLINIC | Facility: CLINIC | Age: 33
End: 2023-07-31

## 2023-07-31 ENCOUNTER — OFFICE VISIT (OUTPATIENT)
Dept: PRIMARY CARE CLINIC | Facility: CLINIC | Age: 33
End: 2023-07-31
Payer: COMMERCIAL

## 2023-07-31 VITALS
OXYGEN SATURATION: 98 % | HEART RATE: 72 BPM | BODY MASS INDEX: 24.34 KG/M2 | DIASTOLIC BLOOD PRESSURE: 78 MMHG | HEIGHT: 62 IN | SYSTOLIC BLOOD PRESSURE: 122 MMHG | WEIGHT: 132.25 LBS

## 2023-07-31 DIAGNOSIS — G44.52 NEW DAILY PERSISTENT HEADACHE: ICD-10-CM

## 2023-07-31 DIAGNOSIS — R20.2 NUMBNESS AND TINGLING OF LEFT SIDE OF FACE: ICD-10-CM

## 2023-07-31 DIAGNOSIS — R20.0 NUMBNESS AND TINGLING OF LEFT SIDE OF FACE: ICD-10-CM

## 2023-07-31 DIAGNOSIS — R20.2 PARESTHESIAS: Primary | ICD-10-CM

## 2023-07-31 DIAGNOSIS — K59.04 CHRONIC IDIOPATHIC CONSTIPATION: ICD-10-CM

## 2023-07-31 DIAGNOSIS — F41.1 GENERALIZED ANXIETY DISORDER: ICD-10-CM

## 2023-07-31 PROCEDURE — 3074F SYST BP LT 130 MM HG: CPT | Mod: CPTII,S$GLB,, | Performed by: FAMILY MEDICINE

## 2023-07-31 PROCEDURE — 3008F BODY MASS INDEX DOCD: CPT | Mod: CPTII,S$GLB,, | Performed by: FAMILY MEDICINE

## 2023-07-31 PROCEDURE — 3044F PR MOST RECENT HEMOGLOBIN A1C LEVEL <7.0%: ICD-10-PCS | Mod: CPTII,S$GLB,, | Performed by: FAMILY MEDICINE

## 2023-07-31 PROCEDURE — 3078F PR MOST RECENT DIASTOLIC BLOOD PRESSURE < 80 MM HG: ICD-10-PCS | Mod: CPTII,S$GLB,, | Performed by: FAMILY MEDICINE

## 2023-07-31 PROCEDURE — 1159F MED LIST DOCD IN RCRD: CPT | Mod: CPTII,S$GLB,, | Performed by: FAMILY MEDICINE

## 2023-07-31 PROCEDURE — 99215 PR OFFICE/OUTPT VISIT, EST, LEVL V, 40-54 MIN: ICD-10-PCS | Mod: S$GLB,,, | Performed by: FAMILY MEDICINE

## 2023-07-31 PROCEDURE — 3044F HG A1C LEVEL LT 7.0%: CPT | Mod: CPTII,S$GLB,, | Performed by: FAMILY MEDICINE

## 2023-07-31 PROCEDURE — 1160F PR REVIEW ALL MEDS BY PRESCRIBER/CLIN PHARMACIST DOCUMENTED: ICD-10-PCS | Mod: CPTII,S$GLB,, | Performed by: FAMILY MEDICINE

## 2023-07-31 PROCEDURE — 99999 PR PBB SHADOW E&M-EST. PATIENT-LVL III: CPT | Mod: PBBFAC,,, | Performed by: FAMILY MEDICINE

## 2023-07-31 PROCEDURE — 3008F PR BODY MASS INDEX (BMI) DOCUMENTED: ICD-10-PCS | Mod: CPTII,S$GLB,, | Performed by: FAMILY MEDICINE

## 2023-07-31 PROCEDURE — 3078F DIAST BP <80 MM HG: CPT | Mod: CPTII,S$GLB,, | Performed by: FAMILY MEDICINE

## 2023-07-31 PROCEDURE — 1159F PR MEDICATION LIST DOCUMENTED IN MEDICAL RECORD: ICD-10-PCS | Mod: CPTII,S$GLB,, | Performed by: FAMILY MEDICINE

## 2023-07-31 PROCEDURE — 1160F RVW MEDS BY RX/DR IN RCRD: CPT | Mod: CPTII,S$GLB,, | Performed by: FAMILY MEDICINE

## 2023-07-31 PROCEDURE — 99215 OFFICE O/P EST HI 40 MIN: CPT | Mod: S$GLB,,, | Performed by: FAMILY MEDICINE

## 2023-07-31 PROCEDURE — 3074F PR MOST RECENT SYSTOLIC BLOOD PRESSURE < 130 MM HG: ICD-10-PCS | Mod: CPTII,S$GLB,, | Performed by: FAMILY MEDICINE

## 2023-07-31 PROCEDURE — 99999 PR PBB SHADOW E&M-EST. PATIENT-LVL III: ICD-10-PCS | Mod: PBBFAC,,, | Performed by: FAMILY MEDICINE

## 2023-07-31 RX ORDER — LORAZEPAM 0.5 MG/1
0.5 TABLET ORAL 2 TIMES DAILY
Qty: 30 TABLET | Refills: 0 | Status: SHIPPED | OUTPATIENT
Start: 2023-07-31 | End: 2023-08-30

## 2023-07-31 RX ORDER — ESCITALOPRAM OXALATE 5 MG/1
5 TABLET ORAL DAILY
Qty: 30 TABLET | Refills: 1 | Status: SHIPPED | OUTPATIENT
Start: 2023-07-31 | End: 2023-08-30 | Stop reason: SDUPTHER

## 2023-07-31 RX ORDER — LINACLOTIDE 72 UG/1
72 CAPSULE, GELATIN COATED ORAL
COMMUNITY
Start: 2023-05-04

## 2023-07-31 RX ORDER — ONDANSETRON 4 MG/1
4 TABLET, ORALLY DISINTEGRATING ORAL EVERY 4 HOURS PRN
COMMUNITY
Start: 2023-05-04

## 2023-07-31 NOTE — PROGRESS NOTES
"    /78 (BP Location: Left arm, Patient Position: Sitting, BP Method: Medium (Manual))   Pulse 72   Ht 5' 2" (1.575 m)   Wt 60 kg (132 lb 4.4 oz)   LMP 07/31/2023   SpO2 98%   BMI 24.19 kg/m²       ===========    Chief Complaint: No chief complaint on file.          HPI    Josefina Boston is a 32 y.o. female     here with complaints of left-sided facial numbness and other paresthesias and for approx the past month.    Saw her pcp 4 days ago re this. Per that note, symptoms were noted to be unilateral then. Portions of that note as follows:    "Dysesthesia:  The patient came in because of her current [employer] [concern] for dysesthesia of her left arm.  It is interesting that it was associated with no pain, no neck stiffness, no shoulder issues.  It is also unilateral which would likely exclude anxiety.  Similarly the lack of pain would exclude in most instances a pinched nerve.  Occasionally tooth aches can give paresthesias into the upper arm neck and jaw area however her dentition was normal.  The patient it should be noted is right-handed.  Symptoms are relieved with rest and they do not wake her from sleep.  The symptoms are also relieved by yoga.  This also is not typical for anxiety.  There is no evidence of a vasculitis on exam.  Also her neurologic exam is essentially normal except for some mild difference in sensation between left and right but could not be quantitated.  Though the patient notes increased anxiety in the last month there is also been a significant heat wave and the history of symptoms in space and time with variation is very suggestive possibility of MS.  Migraine headache without the headache also call migraine accompaniment syndrome should be considered as this can produce toby dysesthesia as the patient describes.  Plan:  Explanation the above  Routine laboratory + sed rate, C-reactive protein, B12, folate, magnesium.    Diary symptoms noting location, length they last, with a " "weight from sleep, whether whether or hot showers may influence.  BuSpar 5 mg b.i.d. p.r.n. for symptoms  I would like to see the patient in 1 month.    If sed rate and C reactive protein are elevated or the symptoms do not resolve MRI of the neck and brain would be indicated"    =============    Patient reports symptoms 1st started in the left upper extremity and then "spread to my face.    She states she presently cannot feel part of her face from the lateral side of her mouth on the left towards the left ear/temporal area.    Of note, symptoms are now not unilateral but sometimes occurs on the right side of her face and then sometimes improve on the left side of her face.  Paresthesias often now involve various parts of her body including neck and upper extremities at times.  Symptoms are not always constant.  She denies any muscle weakness.  She denies any dropping things.  Denies any change in speech or slurred speech.  Denies any confusion.    Patient does not typically have headaches very often but she has had headaches for 2 days.  Yesterday, she had a left frontal headache yesterday.  It was not the worst headache of her life.  It did not last for several hours.  She did not have severe pain behind the eye with severe watering of the eye .  She had a mild to moderate transient headache also today    No diagnosis of migraines.      Patient reports some undue stress recently.  Recently going through a break-up.  Still thinks about her mother a good bit.  Her mother  of an apparent hemorrhagic stroke at age 55 stroke (basal ganglia bleed) which occurred about 3 years ago.      Patient's sister is age 37 and has ho hypothyroidism and was reportedly checked for possible berry aneurysms etcetera; the sister is reportedly without any neurological disorder and no history of stroke  Dad is alive and has hypothyroidism.    Patient queried and denies any further complaints    Patient has MEDICAL HISTORY " OF  Patient Active Problem List   Diagnosis    Family history of thyroid disease    Numbness and tingling of left side of face    Paresthesias    Generalized anxiety disorder    Chronic idiopathic constipation       SURGICAL AND MEDICAL HISTORY: updated and reviewed.  History reviewed. No pertinent surgical history.  ALLERGIES updated and reviewed.  Review of patient's allergies indicates:  No Known Allergies    CURRENT OUTPATIENT MEDICATIONS updated and reviewed    Current Outpatient Medications:     LINZESS 72 mcg Cap capsule, Take 72 mcg by mouth., Disp: , Rfl:     ondansetron (ZOFRAN-ODT) 4 MG TbDL, Take 4 mg by mouth every 4 (four) hours as needed., Disp: , Rfl:     EScitalopram oxalate (LEXAPRO) 5 MG Tab, Take 1 tablet (5 mg total) by mouth once daily., Disp: 30 tablet, Rfl: 1    LORazepam (ATIVAN) 0.5 MG tablet, Take 1 tablet (0.5 mg total) by mouth 2 (two) times daily. As needed for severe anxiety; use very sparingly. Do not use regularly, Disp: 30 tablet, Rfl: 0    Review of Systems   Constitutional:  Negative for activity change, appetite change, chills, diaphoresis, fatigue, fever and unexpected weight change.   HENT:  Negative for congestion, ear discharge, ear pain, facial swelling, hearing loss, nosebleeds, postnasal drip, rhinorrhea, sinus pressure, sneezing, sore throat, tinnitus, trouble swallowing and voice change.    Eyes:  Negative for photophobia, pain, discharge, redness, itching and visual disturbance.   Respiratory:  Negative for cough, chest tightness, shortness of breath and wheezing.    Cardiovascular:  Negative for chest pain, palpitations and leg swelling.   Gastrointestinal:  Negative for abdominal distention, abdominal pain, anal bleeding, blood in stool, constipation, diarrhea, nausea, rectal pain and vomiting.   Endocrine: Negative for cold intolerance, heat intolerance, polydipsia, polyphagia and polyuria.   Genitourinary:  Negative for difficulty urinating, dysuria and flank pain.  "  Musculoskeletal:  Negative for arthralgias, back pain, joint swelling, myalgias and neck pain.   Skin:  Negative for rash.   Neurological:  Positive for numbness and headaches. Negative for dizziness, tremors, seizures, syncope, speech difficulty, weakness and light-headedness.   Psychiatric/Behavioral:  Negative for behavioral problems, confusion, decreased concentration, dysphoric mood, sleep disturbance and suicidal ideas. The patient is not nervous/anxious and is not hyperactive.        /78 (BP Location: Left arm, Patient Position: Sitting, BP Method: Medium (Manual))   Pulse 72   Ht 5' 2" (1.575 m)   Wt 60 kg (132 lb 4.4 oz)   LMP 07/31/2023   SpO2 98%   BMI 24.19 kg/m²   Physical Exam  Vitals and nursing note reviewed.   Constitutional:       General: She is not in acute distress.     Appearance: Normal appearance. She is well-developed and normal weight. She is not ill-appearing or toxic-appearing.   HENT:      Head: Normocephalic and atraumatic.      Right Ear: Tympanic membrane, ear canal and external ear normal. There is no impacted cerumen.      Left Ear: Tympanic membrane, ear canal and external ear normal. There is no impacted cerumen.      Nose: Nose normal.      Mouth/Throat:      Lips: Pink.      Mouth: Mucous membranes are moist.      Pharynx: No oropharyngeal exudate or posterior oropharyngeal erythema.   Eyes:      General: No scleral icterus.        Right eye: No discharge.         Left eye: No discharge.      Conjunctiva/sclera: Conjunctivae normal.   Cardiovascular:      Rate and Rhythm: Normal rate and regular rhythm.      Pulses: Normal pulses.      Heart sounds: Normal heart sounds. No murmur heard.  Pulmonary:      Effort: Pulmonary effort is normal. No respiratory distress.      Breath sounds: Normal breath sounds. No wheezing or rales.   Abdominal:      General: Bowel sounds are normal. There is no distension.      Palpations: Abdomen is soft. There is no mass.      " Tenderness: There is no abdominal tenderness. There is no right CVA tenderness, left CVA tenderness, guarding or rebound.      Hernia: No hernia is present.   Musculoskeletal:      Cervical back: Normal range of motion and neck supple. No rigidity or tenderness.   Lymphadenopathy:      Cervical: No cervical adenopathy.   Skin:     General: Skin is warm and dry.   Neurological:      General: No focal deficit present.      Mental Status: She is alert and oriented to person, place, and time. Mental status is at baseline.      Sensory: No sensory deficit.      Motor: No weakness.      Coordination: Coordination normal.      Deep Tendon Reflexes: Reflexes normal.   Psychiatric:         Mood and Affect: Mood normal.         Behavior: Behavior normal. Behavior is cooperative.         ASSESSMENT/PLAN  Diagnoses and all orders for this visit:    Paresthesias  -     MRI Brain W WO Contrast; Future  -     TSH; Future  -     T4, Free; Future    Numbness and tingling of left side of face  -     MRI Brain W WO Contrast; Future    New daily persistent headache  -     MRI Brain W WO Contrast; Future    Generalized anxiety disorder    Chronic idiopathic constipation    Other orders  -     LORazepam (ATIVAN) 0.5 MG tablet; Take 1 tablet (0.5 mg total) by mouth 2 (two) times daily. As needed for severe anxiety; use very sparingly. Do not use regularly  -     EScitalopram oxalate (LEXAPRO) 5 MG Tab; Take 1 tablet (5 mg total) by mouth once daily.    Reviewed all labs with patient from  July 27, 2023.  TSH and free T4 from January 2023 were normal.  Will repeat these today.    Doubt multiple sclerosis but I do not have possible working diagnosis for her complaints     Definitely follow-through with planned visit with neurologist.    I think there is at least some anxiety component with at least some of her symptoms.  Some symptoms seem more permanent.  Some seem transient.  Headaches have only been for a couple of days  Will try to get  imaging study covered to rule out mass/bleed.      We discussed a possible SSRI.  Side effects discussed which could indeed include the possibility of decreased libido, increased difficulty having orgasm and increased appetite.  Discussed with patient that these are indeed possible side effects but that most patients do not have these side effects with at least these 2 medications in the SSRI family.      She has taken Lexapro before and she is a bit worried about taking a 10 mg dose so we will start her on a very small dose.  She is amenable with starting a 5 mg dose of escitalopram AKA Lexapro.  Educated patient that I suspect we will need to go up on this dose.      She has already stopped the BuSpar it does not wish to continue this.    Educated patient that I will go for this 1 time prescription with lorazepam--I had suggested a very low-dose Xanax which she had taken before-- but will not continue to prescribe lorazepam.  She reports she previously had a prescription for Xanax 0.25 mg and had approximately 10 tablets that lasted her well over a year which is certainly reasonable.  Quantity 30 of lorazepam 0.5 mg prescribed.      Would recommend consideration of Psychology for psychotherapy because even if the symptoms are not wholly anxiety-related then they are at least exacerbated by such.    Would recommend psychiatry referral if she does not do well with SSRI.    Chronic idiopathic constipation--stable with Linzess.  She hydrates well.  She has high-fiber diet.  Gastroenterology note from August of 2022 reviewed in detail.      All lab results over past 2 years available reviewed inc labs and any cardiology or radiology studies.  Any new prescription medications gone over in detail including reason for taking the medication, the general mechanism of action, most common possible side effects and possible costs, etcetera.    Chronic conditions updated. Other than changes or additions as above, cont current  medications and maintain follow-up with specialists if indicated.     Marco A Farley MD  A dictation device was used to produce this document. Use of such devices sometimes results in grammatical errors or replacement of words that sound similarly.    Est5  Time spent in the evaluation and management of this patient exceeded 40min and greater than 50% of this time was in face-to-face time with the patient on day of the clinic visit.   This includes face-to-face time and non face-to-face time and includes the following:  --preparing to see the patient (eg, obtaining and/or reviewing old records such as, when applicable, primary care notes, specialist notes, hospital notes, review of laboratory tests, and/or radiographic and/or cardiology or other studies)  --performing a medically appropriate review of systems and examination and/or evaluation  --reviewing and independently interpreting results (not separately reported; eg, lab results) and communicating results to the patient and/or family/caregiver  --placing orders and/or reviewing other physician's orders which can both include medications, laboratory studies, radiographic studies, procedures, referrals etcetera and   --counseling and educating the patient and/or family member/caregiver regarding the treatment plan  --documentation of the visit in the electronic health record  --communicating with other health care providers regarding referrals, studies, follow-up, etc

## 2023-08-01 ENCOUNTER — HOSPITAL ENCOUNTER (EMERGENCY)
Facility: HOSPITAL | Age: 33
Discharge: HOME | End: 2023-08-02
Attending: EMERGENCY MEDICINE
Payer: COMMERCIAL

## 2023-08-01 ENCOUNTER — DOCUMENTATION ONLY (OUTPATIENT)
Dept: PRIMARY CARE CLINIC | Facility: CLINIC | Age: 33
End: 2023-08-01
Payer: COMMERCIAL

## 2023-08-01 DIAGNOSIS — R90.89 ABNORMAL FINDING ON MRI OF BRAIN: ICD-10-CM

## 2023-08-01 DIAGNOSIS — R20.0 ARM NUMBNESS LEFT: ICD-10-CM

## 2023-08-01 DIAGNOSIS — R20.2 FACIAL PARESTHESIA: Primary | ICD-10-CM

## 2023-08-01 PROBLEM — K59.04 CHRONIC IDIOPATHIC CONSTIPATION: Status: ACTIVE | Noted: 2023-08-01

## 2023-08-01 PROBLEM — F41.1 GENERALIZED ANXIETY DISORDER: Status: ACTIVE | Noted: 2023-08-01

## 2023-08-01 LAB
ALBUMIN SERPL BCP-MCNC: 4.4 G/DL (ref 3.5–5.2)
ALP SERPL-CCNC: 47 U/L (ref 55–135)
ALT SERPL W/O P-5'-P-CCNC: 16 U/L (ref 10–44)
ANION GAP SERPL CALC-SCNC: 10 MMOL/L (ref 8–16)
AST SERPL-CCNC: 23 U/L (ref 10–40)
B-HCG UR QL: NEGATIVE
BASOPHILS # BLD AUTO: 0.03 K/UL (ref 0–0.2)
BASOPHILS NFR BLD: 0.5 % (ref 0–1.9)
BILIRUB SERPL-MCNC: 0.7 MG/DL (ref 0.1–1)
BUN SERPL-MCNC: 10 MG/DL (ref 6–20)
CALCIUM SERPL-MCNC: 9.5 MG/DL (ref 8.7–10.5)
CHLORIDE SERPL-SCNC: 104 MMOL/L (ref 95–110)
CO2 SERPL-SCNC: 25 MMOL/L (ref 23–29)
CREAT SERPL-MCNC: 0.8 MG/DL (ref 0.5–1.4)
CRP SERPL-MCNC: 0.8 MG/L (ref 0–8.2)
CTP QC/QA: YES
DIFFERENTIAL METHOD: NORMAL
EOSINOPHIL # BLD AUTO: 0.1 K/UL (ref 0–0.5)
EOSINOPHIL NFR BLD: 0.8 % (ref 0–8)
ERYTHROCYTE [DISTWIDTH] IN BLOOD BY AUTOMATED COUNT: 12.8 % (ref 11.5–14.5)
ERYTHROCYTE [SEDIMENTATION RATE] IN BLOOD BY PHOTOMETRIC METHOD: 10 MM/HR (ref 0–36)
EST. GFR  (NO RACE VARIABLE): >60 ML/MIN/1.73 M^2
FOLATE SERPL-MCNC: 6.3 NG/ML (ref 4–24)
GLUCOSE SERPL-MCNC: 95 MG/DL (ref 70–110)
HCT VFR BLD AUTO: 42.4 % (ref 37–48.5)
HGB BLD-MCNC: 14 G/DL (ref 12–16)
IMM GRANULOCYTES # BLD AUTO: 0.02 K/UL (ref 0–0.04)
IMM GRANULOCYTES NFR BLD AUTO: 0.3 % (ref 0–0.5)
LYMPHOCYTES # BLD AUTO: 1.5 K/UL (ref 1–4.8)
LYMPHOCYTES NFR BLD: 24.5 % (ref 18–48)
MAGNESIUM SERPL-MCNC: 1.9 MG/DL (ref 1.6–2.6)
MCH RBC QN AUTO: 27 PG (ref 27–31)
MCHC RBC AUTO-ENTMCNC: 33 G/DL (ref 32–36)
MCV RBC AUTO: 82 FL (ref 82–98)
MONOCYTES # BLD AUTO: 0.4 K/UL (ref 0.3–1)
MONOCYTES NFR BLD: 6.1 % (ref 4–15)
NEUTROPHILS # BLD AUTO: 4.2 K/UL (ref 1.8–7.7)
NEUTROPHILS NFR BLD: 67.8 % (ref 38–73)
NRBC BLD-RTO: 0 /100 WBC
PLATELET # BLD AUTO: 231 K/UL (ref 150–450)
PMV BLD AUTO: 10.1 FL (ref 9.2–12.9)
POTASSIUM SERPL-SCNC: 3.8 MMOL/L (ref 3.5–5.1)
PROT SERPL-MCNC: 7.6 G/DL (ref 6–8.4)
RBC # BLD AUTO: 5.18 M/UL (ref 4–5.4)
SODIUM SERPL-SCNC: 139 MMOL/L (ref 136–145)
TSH SERPL DL<=0.005 MIU/L-ACNC: 0.63 UIU/ML (ref 0.4–4)
VIT B12 SERPL-MCNC: 247 PG/ML (ref 210–950)
WBC # BLD AUTO: 6.2 K/UL (ref 3.9–12.7)

## 2023-08-01 PROCEDURE — 80053 COMPREHEN METABOLIC PANEL: CPT | Performed by: PHYSICIAN ASSISTANT

## 2023-08-01 PROCEDURE — 84443 ASSAY THYROID STIM HORMONE: CPT | Performed by: PHYSICIAN ASSISTANT

## 2023-08-01 PROCEDURE — 81025 URINE PREGNANCY TEST: CPT | Performed by: PHYSICIAN ASSISTANT

## 2023-08-01 PROCEDURE — 85025 COMPLETE CBC W/AUTO DIFF WBC: CPT | Performed by: PHYSICIAN ASSISTANT

## 2023-08-01 PROCEDURE — 25500020 PHARM REV CODE 255: Performed by: EMERGENCY MEDICINE

## 2023-08-01 PROCEDURE — 86140 C-REACTIVE PROTEIN: CPT | Performed by: PHYSICIAN ASSISTANT

## 2023-08-01 PROCEDURE — 83735 ASSAY OF MAGNESIUM: CPT | Performed by: PHYSICIAN ASSISTANT

## 2023-08-01 PROCEDURE — A9585 GADOBUTROL INJECTION: HCPCS | Performed by: EMERGENCY MEDICINE

## 2023-08-01 PROCEDURE — 82746 ASSAY OF FOLIC ACID SERUM: CPT | Performed by: PHYSICIAN ASSISTANT

## 2023-08-01 PROCEDURE — 82607 VITAMIN B-12: CPT | Performed by: PHYSICIAN ASSISTANT

## 2023-08-01 PROCEDURE — 99285 EMERGENCY DEPT VISIT HI MDM: CPT | Mod: 25

## 2023-08-01 PROCEDURE — 85652 RBC SED RATE AUTOMATED: CPT | Performed by: PHYSICIAN ASSISTANT

## 2023-08-01 RX ORDER — GADOBUTROL 604.72 MG/ML
6 INJECTION INTRAVENOUS
Status: COMPLETED | OUTPATIENT
Start: 2023-08-01 | End: 2023-08-01

## 2023-08-01 RX ADMIN — GADOBUTROL 6 ML: 604.72 INJECTION INTRAVENOUS at 08:08

## 2023-08-01 NOTE — ED PROVIDER NOTES
Encounter Date: 8/1/2023       History     Chief Complaint   Patient presents with    Facial Numbness     Patient reports left side facial, LUE, and right side facial numbness x two weeks. Denies any known injuries, trauma, or visual disturbances.       Patient is a 32 year old female.  She has a past medical history of ADD and anxiety.  She works as a nurse here at [employer].  She presents to the ER for an emergent evaluation due to having acute numbness of her left face and left upper extremity for the past 2-3 weeks.  She is been primary care for this twice since onset.  She was given prescriptions for Ativan and Lexapro, and also referred to Neurology, but is unable to be seen until November. She states that she is worried that her symptoms are progressing because now she is also experiencing acute intermittent numbness on the right side of her face since yesterday as well.  She states that her primary care physician expressed concern for the possibility of multiple sclerosis, and advised that she may require an MRI.  She denies any associated headache, neck pain, back pain, chest pain, dizziness, speech difficulty, focal weakness, vision changes, palpitations, gait difficulty, or fevers.  She denies any alcohol or illicit drug use.  She denies any recent Botox use.  She denies any mitigating or exacerbating factors.  She is requesting to have further testing including MRI to evaluate for possible MS or CNS etiologies.            Review of patient's allergies indicates:  No Known Allergies  Past Medical History:   Diagnosis Date    Anxiety     Attention deficit disorder (ADD)      History reviewed. No pertinent surgical history.  Family History   Problem Relation Age of Onset    Hypertension Mother     Stroke Mother     Hypertension Father     Hypothyroidism Father     Hypothyroidism Sister     No Known Problems Maternal Aunt     No Known Problems Maternal Uncle     No Known Problems Paternal Aunt     No Known  Problems Paternal Uncle     No Known Problems Maternal Grandfather     No Known Problems Paternal Grandmother     No Known Problems Paternal Grandfather     Celiac disease Neg Hx     Cirrhosis Neg Hx     Colon cancer Neg Hx     Colon polyps Neg Hx     Crohn's disease Neg Hx     Esophageal cancer Neg Hx     Inflammatory bowel disease Neg Hx     Liver cancer Neg Hx     Liver disease Neg Hx     Rectal cancer Neg Hx     Stomach cancer Neg Hx     Ulcerative colitis Neg Hx     Ovarian cancer Neg Hx     Uterine cancer Neg Hx     Kidney cancer Neg Hx     Bladder Cancer Neg Hx     Pancreatitis Neg Hx     Pancreatic cancer Neg Hx      Social History     Tobacco Use    Smoking status: Never    Smokeless tobacco: Never   Substance Use Topics    Alcohol use: Yes     Comment: social    Drug use: Never     Review of Systems   Constitutional:  Negative for activity change, appetite change, chills, diaphoresis, fatigue and fever.   HENT:  Negative for congestion, facial swelling, hearing loss, sinus pain, sore throat, tinnitus and trouble swallowing.    Eyes:  Negative for photophobia, pain and visual disturbance.   Respiratory:  Negative for shortness of breath.    Cardiovascular:  Negative for chest pain and palpitations.   Gastrointestinal:  Negative for abdominal pain, diarrhea, nausea and vomiting.   Genitourinary:  Negative for difficulty urinating and menstrual problem.   Musculoskeletal:  Negative for back pain, gait problem, myalgias, neck pain and neck stiffness.   Skin:  Negative for rash.   Allergic/Immunologic: Negative for immunocompromised state.   Neurological:  Positive for numbness. Negative for dizziness, tremors, seizures, syncope, facial asymmetry, speech difficulty, weakness, light-headedness and headaches.   Psychiatric/Behavioral:  Negative for agitation, behavioral problems, confusion and sleep disturbance.        Physical Exam     Initial Vitals [08/01/23 1216]   BP Pulse Resp Temp SpO2   (!) 164/98 80 16  98.6 °F (37 °C) 98 %      MAP       --         Physical Exam    Nursing note and vitals reviewed.  Constitutional: She appears well-developed and well-nourished. She is not diaphoretic. No distress.   Alert and ambulatory.  Well-appearing.  No obvious distress.   HENT:   Head: Normocephalic.   Eyes: EOM are normal. Pupils are equal, round, and reactive to light.   Neck: Neck supple.   Cardiovascular:  Normal rate.           Pulmonary/Chest: No respiratory distress.   Abdominal: She exhibits no distension.   Musculoskeletal:         General: Normal range of motion.      Cervical back: Neck supple.     Neurological: She is alert and oriented to person, place, and time. She has normal strength. No sensory deficit.   No facial droop.  No dysarthric speech.  Oriented appropriately.  Normal gait.  5/5 strength extremities x4.  No focal deficit.   Skin: Skin is warm and dry.   Psychiatric: She has a normal mood and affect.         ED Course   Procedures  Labs Reviewed   COMPREHENSIVE METABOLIC PANEL - Abnormal; Notable for the following components:       Result Value    Alkaline Phosphatase 47 (*)     All other components within normal limits   CBC W/ AUTO DIFFERENTIAL   SEDIMENTATION RATE   C-REACTIVE PROTEIN   TSH   MAGNESIUM   VITAMIN B12   FOLATE   POCT URINE PREGNANCY     Results for orders placed or performed during the hospital encounter of 08/01/23   CBC auto differential   Result Value Ref Range    WBC 6.20 3.90 - 12.70 K/uL    RBC 5.18 4.00 - 5.40 M/uL    Hemoglobin 14.0 12.0 - 16.0 g/dL    Hematocrit 42.4 37.0 - 48.5 %    MCV 82 82 - 98 fL    MCH 27.0 27.0 - 31.0 pg    MCHC 33.0 32.0 - 36.0 g/dL    RDW 12.8 11.5 - 14.5 %    Platelets 231 150 - 450 K/uL    MPV 10.1 9.2 - 12.9 fL    Immature Granulocytes 0.3 0.0 - 0.5 %    Gran # (ANC) 4.2 1.8 - 7.7 K/uL    Immature Grans (Abs) 0.02 0.00 - 0.04 K/uL    Lymph # 1.5 1.0 - 4.8 K/uL    Mono # 0.4 0.3 - 1.0 K/uL    Eos # 0.1 0.0 - 0.5 K/uL    Baso # 0.03 0.00 - 0.20  K/uL    nRBC 0 0 /100 WBC    Gran % 67.8 38.0 - 73.0 %    Lymph % 24.5 18.0 - 48.0 %    Mono % 6.1 4.0 - 15.0 %    Eosinophil % 0.8 0.0 - 8.0 %    Basophil % 0.5 0.0 - 1.9 %    Differential Method Automated    Comprehensive metabolic panel   Result Value Ref Range    Sodium 139 136 - 145 mmol/L    Potassium 3.8 3.5 - 5.1 mmol/L    Chloride 104 95 - 110 mmol/L    CO2 25 23 - 29 mmol/L    Glucose 95 70 - 110 mg/dL    BUN 10 6 - 20 mg/dL    Creatinine 0.8 0.5 - 1.4 mg/dL    Calcium 9.5 8.7 - 10.5 mg/dL    Total Protein 7.6 6.0 - 8.4 g/dL    Albumin 4.4 3.5 - 5.2 g/dL    Total Bilirubin 0.7 0.1 - 1.0 mg/dL    Alkaline Phosphatase 47 (L) 55 - 135 U/L    AST 23 10 - 40 U/L    ALT 16 10 - 44 U/L    eGFR >60.0 >60 mL/min/1.73 m^2    Anion Gap 10 8 - 16 mmol/L   Sedimentation rate   Result Value Ref Range    Sed Rate 10 0 - 36 mm/Hr   C-reactive protein   Result Value Ref Range    CRP 0.8 0.0 - 8.2 mg/L   TSH   Result Value Ref Range    TSH 0.626 0.400 - 4.000 uIU/mL   Magnesium   Result Value Ref Range    Magnesium 1.9 1.6 - 2.6 mg/dL   Vitamin B12   Result Value Ref Range    Vitamin B-12 247 210 - 950 pg/mL   Folate   Result Value Ref Range    Folate 6.3 4.0 - 24.0 ng/mL   POCT urine pregnancy   Result Value Ref Range    POC Preg Test, Ur Negative Negative     Acceptable Yes             Imaging Results              MRI Cervical Spine Demyelinating W W/O Contrast (Final result)  Result time 08/01/23 21:43:46      Final result by Joel Keyes MD (08/01/23 21:43:46)                   Impression:      No acute intracranial or intraspinous process.    Scattered T2/FLAIR hyperintense lesions, some of which involving the corpus callosum.  Overall findings nonspecific and may be seen with either migraine or demyelinating disease.  Continued follow-up suggested.    MRI of the cervical spine is normal.  No intramedullary within the cervical spine.    Subcentimeter left parafalcine meningioma.    Electronically signed  by resident: Anca Samayoa  Date:    08/01/2023  Time:    21:16    Electronically signed by: Joel Keyes MD  Date:    08/01/2023  Time:    21:43               Narrative:    EXAMINATION:  MRI BRAIN DEMYELINATING W/ WO CONTRAST; MRI CERVICAL SPINE DEMYELINATING W W/O CONTRAST    CLINICAL HISTORY:  Paresthesia;    TECHNIQUE:  Multiplanar multisequence MR imaging of the brain and cervical spine was performed before and after the administration of 6 mL of Gadavist intravenous contrast.    COMPARISON:  None.    FINDINGS:  BRAIN:    Intracranial Compartment:    Ventricles are stable in size.  No hydrocephalus.    Multiple scattered T2/FLAIR hyperintense lesions some of which involving the corpus callosum.    No new parenchymal mass, hemorrhage or infarction.    No extra-axial blood or fluid collections.    Normal vascular flow voids are preserved.    Skull/Extracranial Contents (limited evaluation):    Bone marrow signal intensity is normal.    There is an 8 mm left parafalcine meningioma.  No additional lesion is identified.    CERVICAL SPINE:    Alignment: Normal.    Vertebrae: There is an intraosseous hemangioma in the T2 vertebral body.  The remaining bone marrow signal is unremarkable.  No fracture.    Discs: Normal.    Cord: Normal.    Skull base and craniocervical junction: Normal.    Degenerative findings:    No significant degenerative changes.    Paraspinal muscles & soft tissues: Normal.                                       MRI Brain Demyelinating W W/O Contrast (Final result)  Result time 08/01/23 21:43:46      Final result by Joel Keyes MD (08/01/23 21:43:46)                   Impression:      No acute intracranial or intraspinous process.    Scattered T2/FLAIR hyperintense lesions, some of which involving the corpus callosum.  Overall findings nonspecific and may be seen with either migraine or demyelinating disease.  Continued follow-up suggested.    MRI of the cervical spine is normal.  No  intramedullary within the cervical spine.    Subcentimeter left parafalcine meningioma.    Electronically signed by resident: Anca Samayoa  Date:    08/01/2023  Time:    21:16    Electronically signed by: Joel Keyes MD  Date:    08/01/2023  Time:    21:43               Narrative:    EXAMINATION:  MRI BRAIN DEMYELINATING W/ WO CONTRAST; MRI CERVICAL SPINE DEMYELINATING W W/O CONTRAST    CLINICAL HISTORY:  Paresthesia;    TECHNIQUE:  Multiplanar multisequence MR imaging of the brain and cervical spine was performed before and after the administration of 6 mL of Gadavist intravenous contrast.    COMPARISON:  None.    FINDINGS:  BRAIN:    Intracranial Compartment:    Ventricles are stable in size.  No hydrocephalus.    Multiple scattered T2/FLAIR hyperintense lesions some of which involving the corpus callosum.    No new parenchymal mass, hemorrhage or infarction.    No extra-axial blood or fluid collections.    Normal vascular flow voids are preserved.    Skull/Extracranial Contents (limited evaluation):    Bone marrow signal intensity is normal.    There is an 8 mm left parafalcine meningioma.  No additional lesion is identified.    CERVICAL SPINE:    Alignment: Normal.    Vertebrae: There is an intraosseous hemangioma in the T2 vertebral body.  The remaining bone marrow signal is unremarkable.  No fracture.    Discs: Normal.    Cord: Normal.    Skull base and craniocervical junction: Normal.    Degenerative findings:    No significant degenerative changes.    Paraspinal muscles & soft tissues: Normal.                                       Medications   gadobutroL (GADAVIST) injection 6 mL (6 mLs Intravenous Given 8/1/23 2021)     Medical Decision Making:   History:   Old Medical Records: I decided to obtain old medical records.  Old Records Summarized: records from clinic visits.       <> Summary of Records: Primary care physician notes regarding presenting complaints reviewed  Initial Assessment:    32-year-old female presenting to the ER for an emergent evaluation due to acute paresthesias of face and left upper extremity times 2-3 weeks, seen by primary care physician who expressed concern for possible diagnosis of MS  Differential Diagnosis:   Multiple sclerosis, tumor/mass, CVA, electrolyte derangement, aneurysm, neuromuscular junction disorder, medication reaction, paresthesia, anxiety, stress reaction, complex migraine, anemia, infection, etc.  Clinical Tests:   Lab Tests: Ordered and Reviewed  Radiological Study: Ordered  ED Management:  Vital signs reviewed, benign   Medications reviewed   Records from primary care reviewed   I discussed the case in detail with the ER attending physician, who recommends labs in addition to MRI head and neck demyelination protocol  Labs completed, unremarkable  I gave report and signed outpatient to oncoming CARTER who will assume further care and management due to pending advanced imaging and end of my shift            Attending Attestation:     Physician Attestation Statement for NP/PA:   I have directed and reviewed the workup performed by the PA/NP.  I performed the substantive portion of the medical decision making.                            Clinical Impression:   Final diagnoses:  [R20.2] Facial paresthesia (Primary)  [R20.0] Arm numbness left  [R90.89] Abnormal finding on MRI of brain        ED Disposition Condition    Discharge Stable          ED Prescriptions    None       Follow-up Information       Follow up With Specialties Details Why Contact Info Additional Information    [facility] - Neurology [facility] Neurology Schedule an appointment as soon as possible for a visit  As needed [address]  [facility] [id number] [phone] Neuroscience [place], 7th Floor Please park in [facility] and take Clinic elevator             Beni Schmid PA-C  08/01/23 2122       Octavia Hayward MD  08/07/23 3736

## 2023-08-01 NOTE — Clinical Note
"Josefina Parkinson" Ludy was seen and treated in our emergency department on 8/1/2023.  She may return to work on 08/03/2023.       If you have any questions or concerns, please don't hesitate to call.      ANGE Carmona RN    "

## 2023-08-01 NOTE — PROVIDER PROGRESS NOTES - EMERGENCY DEPT.
Encounter Date: 8/1/2023    ED Physician Progress Notes        Physician Note:   I Received sign-out of this patient from Beni Schmid PA-C due to shift change.  Patient presented to the ED after 2 weeks of facial paresthesia.  Her PCP referred her to Neurology for MS rule out.  Patient's symptoms started to worsen in the past few days prompting her to present the ED. Today, MRI with nonspecific scattered T2 FLAIR hyperintense lesions.  Consulted neurology for further recommendations.  I will sign out at this to  Neisha Catherine NP due to shift change.  Dispo pending Neurology recommendations.

## 2023-08-01 NOTE — PROGRESS NOTES
Reviewed the patient's symptoms with her and she was having increasing anxiety about those symptoms.  The Neurology consult would have not been till November.  The patient then decided to go to the ER but did not call the office.  Evidently she is waiting on the MRI which is being done in the emergency room.  She was not clear if they also were going to have a Neurology consult.  I will contact the patient in the morning once I see the information from the ER in the imaging studies

## 2023-08-01 NOTE — ED NOTES
Pt identifiers Josefina Boston were checked and are correct  LOC: The patient is awake, alert, aware of environment with an appropriate affect. Oriented x4, speaking appropriately  APPEARANCE: Pt resting comfortably, in no acute distress, pt is clean and well groomed, clothing properly fastened  SKIN: Skin warm, dry and intact, normal skin turgor, moist mucus membranes  RESPIRATORY: Airway is open and patent, respirations are spontaneous, even and unlabored, normal effort and rate  Breath sounds clear rachel to all lung fields on auscultation  CARDIAC: Normal rate and rhythm, no peripheral edema noted, capillary refill < 3 seconds, bilateral radial pulses 2+  ABDOMEN: Soft, nontender, nondistended. Bowel sounds present to all four quad of abd on auscultation   NEUROLOGIC: PERRL, facial expression is symmetrical, patient moving all extremities spontaneously, Pt complains of numbness to posterior side of left upper arm   Follows all commands appropriately  MUSCULOSKELETAL: No obvious deformities.

## 2023-08-01 NOTE — ED TRIAGE NOTES
Pt complains of left side facial numbness radiating down left arm times times two weeks and numbness to right cheek since yesterday  Pt states she has numbness and tingling to left upper arm  with intermittent tingling and numbness to left right and little finger

## 2023-08-01 NOTE — ED NOTES
Spoke with MRI who states testing will possibly be done around 8-9pm, update to PA, CN and [facility] staff.. Patient states she wants to stay.

## 2023-08-02 ENCOUNTER — TELEPHONE (OUTPATIENT)
Dept: NEUROLOGY | Facility: CLINIC | Age: 33
End: 2023-08-02
Payer: COMMERCIAL

## 2023-08-02 ENCOUNTER — OFFICE VISIT (OUTPATIENT)
Dept: NEUROLOGY | Facility: CLINIC | Age: 33
End: 2023-08-02
Payer: COMMERCIAL

## 2023-08-02 VITALS
WEIGHT: 132 LBS | HEIGHT: 62 IN | SYSTOLIC BLOOD PRESSURE: 131 MMHG | TEMPERATURE: 98 F | OXYGEN SATURATION: 99 % | HEART RATE: 82 BPM | BODY MASS INDEX: 24.29 KG/M2 | DIASTOLIC BLOOD PRESSURE: 74 MMHG | RESPIRATION RATE: 18 BRPM

## 2023-08-02 DIAGNOSIS — R20.2 FACIAL PARESTHESIA: ICD-10-CM

## 2023-08-02 DIAGNOSIS — R93.89 ABNORMAL MRI: ICD-10-CM

## 2023-08-02 DIAGNOSIS — R20.2 PARESTHESIAS: ICD-10-CM

## 2023-08-02 DIAGNOSIS — R76.8 POSITIVE ANA (ANTINUCLEAR ANTIBODY): ICD-10-CM

## 2023-08-02 DIAGNOSIS — R20.0 NUMBNESS AND TINGLING OF LEFT SIDE OF FACE: Primary | ICD-10-CM

## 2023-08-02 DIAGNOSIS — R20.2 NUMBNESS AND TINGLING OF LEFT SIDE OF FACE: Primary | ICD-10-CM

## 2023-08-02 PROCEDURE — 3044F HG A1C LEVEL LT 7.0%: CPT | Mod: CPTII,S$GLB,, | Performed by: STUDENT IN AN ORGANIZED HEALTH CARE EDUCATION/TRAINING PROGRAM

## 2023-08-02 PROCEDURE — 99999 PR PBB SHADOW E&M-EST. PATIENT-LVL II: ICD-10-PCS | Mod: PBBFAC,,, | Performed by: STUDENT IN AN ORGANIZED HEALTH CARE EDUCATION/TRAINING PROGRAM

## 2023-08-02 PROCEDURE — 3044F PR MOST RECENT HEMOGLOBIN A1C LEVEL <7.0%: ICD-10-PCS | Mod: CPTII,S$GLB,, | Performed by: STUDENT IN AN ORGANIZED HEALTH CARE EDUCATION/TRAINING PROGRAM

## 2023-08-02 PROCEDURE — 99204 PR OFFICE/OUTPT VISIT, NEW, LEVL IV, 45-59 MIN: ICD-10-PCS | Mod: S$GLB,,, | Performed by: STUDENT IN AN ORGANIZED HEALTH CARE EDUCATION/TRAINING PROGRAM

## 2023-08-02 PROCEDURE — 99204 OFFICE O/P NEW MOD 45 MIN: CPT | Mod: S$GLB,,, | Performed by: STUDENT IN AN ORGANIZED HEALTH CARE EDUCATION/TRAINING PROGRAM

## 2023-08-02 PROCEDURE — 99999 PR PBB SHADOW E&M-EST. PATIENT-LVL II: CPT | Mod: PBBFAC,,, | Performed by: STUDENT IN AN ORGANIZED HEALTH CARE EDUCATION/TRAINING PROGRAM

## 2023-08-02 NOTE — TELEPHONE ENCOUNTER
Staff spoke to pt, she is coming early.  ----- Message from Vince Alexander sent at 8/2/2023  2:44 PM CDT -----  Regarding: Return Call  Who Called: DEBBIE FRANK [[phone]]         Who Left Message for Patient: Unsure         Does the patient know what this is regarding? NO         Best Call Back Number:[phone]         Additional Information: Patient is returning a call.  Please assist.

## 2023-08-02 NOTE — ED PROVIDER NOTES
I assumed care of patient from off-going ED Lisette DC. Briefly, Patient is a 32-year-old female presenting for numbness and tingling in her hands and right side of face.  Patient states symptoms have been ongoing for the past 2 weeks but worsened today.  Patient was seen by her PCP in scheduled for an outpatient MRI but was advised come to the ED due to worsening symptoms..  At the time of signout plan was pending recommendations from neurology..  At bedside to introduce self to pt.  Updated given  Will continue to monitor.      Pt given the following medications with in the ED:     Medications   gadobutroL (GADAVIST) injection 6 mL (6 mLs Intravenous Given 8/1/23 2021)     0200- discussed case with Neurology.  Recommends follow-up with neurology clinic in the next 1-2 weeks.  Advised can be discharged home at this time.  Patient given information to follow-up with neurology.  Advised we have placed an urgent referral and advised her to call tomorrow for follow-up.  Patient verbalized understanding of this plan of care.    Patient is hemodynamically stable, vital signs are normal. Discharge instructions given. Return to ED precautions discussed. Follow up as directed. Pt verbalized understanding of this plan.  Pt is stable for discharge.     Clinical Impression:  1. Facial paresthesia    2. Arm numbness left           Neisha Catherine NP  08/02/23 [id number]

## 2023-08-02 NOTE — PROGRESS NOTES
Neurology Clinic Note      Date: 8/2/23  Patient Name: Josefina Boston   MRN: [id number]   PCP: No, Primary Doctor  Referring Provider: Self, Aaareferral    Assessment and Plan:   Josefina Boston is a 32 y.o. female presenting for evaluation of bilateral facial paresthesias and LUE paresthesias. Exam is notable for decreased sensation in the left face. MRI brain shows multiple punctate foci of T2/FLAIR abnormalities in the supratentorial whiote matter and juxtacortical regions. No abnormal enhancement. Cervical spine imaging showed no lesions.  Differential includes demyelinating disease vs migraine. Migraine seems less likely since she has no prior history of frequent headache.   Labs as below along with MRI of the thoracic spine W W/O contrast demyelinating protocol.  Scheduled for LP w/ cell count & diff, protein, glucose, MS panel, ACE, freeze and hold.      Problem List Items Addressed This Visit          Neuro    Numbness and tingling of left side of face - Primary    Relevant Orders    CHASE Screen w/Reflex    Sedimentation rate (Completed)    CSF cell count with differential    Glucose, CSF    Protein, CSF    CSF cell count with differential    Ms Profile    Ms Profile Blood Collection (Completed)    ACE, CSF    ANGIOTENSIN CONVERTING ENZYME    MRI Thoracic Spine Demyelinating W W/O Contrast    C-REACTIVE PROTEIN (Completed)    Copper, serum    Vitamin B12 Deficiency Panel       Other    Abnormal MRI    Relevant Orders    CHASE Screen w/Reflex    Sedimentation rate (Completed)    CSF cell count with differential    Glucose, CSF    Protein, CSF    CSF cell count with differential    Ms Profile    Ms Profile Blood Collection (Completed)    ACE, CSF    ANGIOTENSIN CONVERTING ENZYME    MRI Thoracic Spine Demyelinating W W/O Contrast    C-REACTIVE PROTEIN (Completed)    Copper, serum    Vitamin B12 Deficiency Panel    IR LUMBAR PUNCTURE DIAGNOSTIC WITH IMAGING         Subjective:          HPI:   Ms. Josefina Boston  is a 32 y.o. female with a history of anxiety presenting for evaluation of facial paresthesias.    Around 2 weeks ago, she developed paresthesias involving the left face and left upper extremity. She attributed these symptoms to stress. Tried using Xanax although this had no effect. Around 2 days ago she developed right facial paresthesias along with a left sided/bioccipital headache. Complains of very mild photophobia and phonophobia but no nausea. No weakness, slurred speech, gait instability, vision loss/double vision or incontinence. 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   She got in touch with her PCP and was advised to present to the ER. Underwent MRI of the brain and cervical spine which I reviewed with her today.  MRI of the brain shows multiple foci of punctate white matter changes involving the subcortical white matter and a few in the juxtacortical regions. MRI of the cervical spine was normal. No enhancing lesion in the brain or cervical spine.    No prior history of migraines and does not have frequent headaches. No history of autoimmune conditions.  Mother passed away at 55 yrs of age from a basal ganglia bleed. She had Raynaud's and was being worked up for Scleroderma.   Grandmother had RA.        PAST MEDICAL HISTORY:  Past Medical History:   Diagnosis Date    Anxiety     Attention deficit disorder (ADD)        PAST SURGICAL HISTORY:  No past surgical history on file.    CURRENT MEDS:  Current Outpatient Medications   Medication Sig Dispense Refill    EScitalopram oxalate (LEXAPRO) 5 MG Tab Take 1 tablet (5 mg total) by mouth once daily. 30 tablet 1    LINZESS 72 mcg Cap capsule Take 72 mcg by mouth.      LORazepam (ATIVAN) 0.5 MG tablet Take 1 tablet (0.5 mg total) by mouth 2 (two) times daily. As needed for severe anxiety; use very sparingly. Do not use regularly 30 tablet 0    ondansetron (ZOFRAN-ODT) 4 MG TbDL Take 4 mg by mouth every 4 (four)  hours as needed.       No current facility-administered medications for this visit.       ALLERGIES:  Review of patient's allergies indicates:  No Known Allergies    FAMILY HISTORY:  Family History   Problem Relation Age of Onset    Hypertension Mother     Stroke Mother     Hypertension Father     Hypothyroidism Father     Hypothyroidism Sister     No Known Problems Maternal Aunt     No Known Problems Maternal Uncle     No Known Problems Paternal Aunt     No Known Problems Paternal Uncle     No Known Problems Maternal Grandfather     No Known Problems Paternal Grandmother     No Known Problems Paternal Grandfather     Celiac disease Neg Hx     Cirrhosis Neg Hx     Colon cancer Neg Hx     Colon polyps Neg Hx     Crohn's disease Neg Hx     Esophageal cancer Neg Hx     Inflammatory bowel disease Neg Hx     Liver cancer Neg Hx     Liver disease Neg Hx     Rectal cancer Neg Hx     Stomach cancer Neg Hx     Ulcerative colitis Neg Hx     Ovarian cancer Neg Hx     Uterine cancer Neg Hx     Kidney cancer Neg Hx     Bladder Cancer Neg Hx     Pancreatitis Neg Hx     Pancreatic cancer Neg Hx        SOCIAL HISTORY:  Social History     Tobacco Use    Smoking status: Never    Smokeless tobacco: Never   Substance Use Topics    Alcohol use: Yes     Comment: social    Drug use: Never       Review of Systems:  12 system review of systems is negative except for the symptoms mentioned in HPI.      Objective:   There were no vitals filed for this visit.  General: NAD, well nourished   Eyes: no tearing, discharge, no erythema   Neck: Supple, full range of motion  Cardiovascular: Warm and well perfused  Lungs: Normal work of breathing  Skin: No rash, lesions, or breakdown on exposed skin  Psychiatry: Mood and affect are appropriate       NEUROLOGICAL EXAMINATION:     MENTAL STATUS   Oriented to person, place, and time.   Follows 2 step commands.   Speech: speech is normal   Level of consciousness: alert    CRANIAL NERVES     CN II   Visual  fields full to confrontation.     CN III, IV, VI   Extraocular motions are normal.   Right pupil: Consensual response: intact.   Left pupil: Consensual response: intact.   Nystagmus: none   Ophthalmoparesis: none    CN V   Left facial sensation deficit: complete    CN IX, X   CN IX normal.     CN XI   CN XI normal.     CN XII   CN XII normal.     MOTOR EXAM   Overall muscle tone: normal  Right arm pronator drift: absent  Left arm pronator drift: absent    Strength   Right deltoid: 5/5  Left deltoid: 5/5  Right biceps: 5/5  Left biceps: 5/5  Right triceps: 5/5  Right wrist flexion: 5/5  Left wrist flexion: 5/5  Right wrist extension: 5/5  Left wrist extension: 5/5  Right interossei: 5/5  Left interossei: 5/5  Right iliopsoas: 5/5  Left iliopsoas: 5/5  Right quadriceps: 5/5  Left quadriceps: 5/5  Right hamstrin/5  Left hamstrin/5  Right glutei: 5/5  Left glutei: 5/5  Right anterior tibial: 5/5  Left anterior tibial: 5/5  Right gastroc: 5/5  Left gastroc: 5/5    REFLEXES     Reflexes   Right brachioradialis: 2+  Left brachioradialis: 2+  Right biceps: 2+  Left biceps: 2+  Right patellar: 2+  Left patellar: 2+  Right achilles: 2+  Left achilles: 2+  Right plantar: normal  Left plantar: normal  Right Zee: absent  Left Zee: absent  Right ankle clonus: absent  Left ankle clonus: absent    SENSORY EXAM   Right arm light touch: normal  Left arm light touch: normal  Right leg light touch: normal  Left leg light touch: normal    GAIT AND COORDINATION     Gait  Gait: normal     Coordination   Finger to nose coordination: normal  Heel to shin coordination: normal    Tremor   Intention tremor: absent        Images:    Other Studies:          Go Antonio MD  Department of Neurology  [facility]

## 2023-08-03 PROBLEM — R76.8 POSITIVE ANA (ANTINUCLEAR ANTIBODY): Status: ACTIVE | Noted: 2023-08-03

## 2023-08-05 NOTE — PROGRESS NOTES
Subjective:     Patient ID: Josefina Boston is a 32 y.o. female sent for consultation on a +CHASE by Go Antonio MD    Chief Complaint: No chief complaint on file.       HPI    32 yr old lady with ADD, anxiety & premenstrual syndrome & IBS here for a +CHASE 1:160H (neg pro)   Also has hx of low vitamin D.  Recently has been c/o L facial & L arm numbness.    Has been having numbness on left face & back of left head & intermittently down to fingers (different ones) started end July  Would wake up with it.  Lasted until ~ 8/5; now better.  Sister has migraines.    MGM has RA  Mom had a CVA at age 55; was being w/u for scleroderma. (Cool hands);   Passed away at age 55--had basal ganglia bleed ~ 2020;   Sister & dad w hypothyroidism.  Went to ED    Had MRI that showed demyelination    AM stiffness- .  joint pain --knees occasionally  LBP--no  joint swelling-  Raynaud's-  Dysphagia-  tight skin-  Alopecia-   oral/nasal ulcers-   parotid gland swelling-  dry eyes-  dry mouth-  Pleurisy-  Pericarditis-  Photosensitivity-  skin rashes-  Miscarriages-0/0  Thromboses-  muscle weakness-  Fevers-  Headaches--not in past  Conjunctivitis-  Constipation--dx as IBS c--not diarrhea  vaginal/urethral D/C -  paresthesias as above;   thyroid issues--not in patient.      Current Outpatient Medications   Medication Sig Dispense Refill    EScitalopram oxalate (LEXAPRO) 5 MG Tab Take 1 tablet (5 mg total) by mouth once daily. 30 tablet 1    LINZESS 72 mcg Cap capsule Take 72 mcg by mouth.      LORazepam (ATIVAN) 0.5 MG tablet Take 1 tablet (0.5 mg total) by mouth 2 (two) times daily. As needed for severe anxiety; use very sparingly. Do not use regularly 30 tablet 0    ondansetron (ZOFRAN-ODT) 4 MG TbDL Take 4 mg by mouth every 4 (four) hours as needed.       No current facility-administered medications for this visit.       Review of patient's allergies indicates:  No Known Allergies    Review of Systems   Constitutional:   Negative for fatigue (worse recently), fever and unexpected weight change.   HENT:  Negative for mouth sores, tinnitus and trouble swallowing.    Eyes:  Negative for redness.   Respiratory:  Negative for cough and shortness of breath.    Cardiovascular:  Negative for chest pain.   Gastrointestinal:  Positive for constipation. Negative for diarrhea.        Controlled even wo linzess   Endocrine: Negative for cold intolerance and heat intolerance.   Genitourinary:  Negative for dysuria, genital sores and menstrual problem.        Premenstrual syndrome   Musculoskeletal:  Negative for back pain and myalgias.   Skin:  Negative for rash.   Neurological:  Positive for numbness and headaches.   Hematological:  Does not bruise/bleed easily.   Psychiatric/Behavioral:  The patient is nervous/anxious.         Has ADD on Adderall since age 18  Mom  2020  Had xanax  Lexapro began        Past Medical History:   Diagnosis Date    Anxiety     Attention deficit disorder (ADD)        No past surgical history on file.    Family History   Problem Relation Age of Onset    Hypertension Mother     Stroke Mother     Hypertension Father     Hypothyroidism Father     Hypothyroidism Sister     No Known Problems Maternal Aunt     No Known Problems Maternal Uncle     No Known Problems Paternal Aunt     No Known Problems Paternal Uncle     No Known Problems Maternal Grandfather     No Known Problems Paternal Grandmother     No Known Problems Paternal Grandfather     Celiac disease Neg Hx     Cirrhosis Neg Hx     Colon cancer Neg Hx     Colon polyps Neg Hx     Crohn's disease Neg Hx     Esophageal cancer Neg Hx     Inflammatory bowel disease Neg Hx     Liver cancer Neg Hx     Liver disease Neg Hx     Rectal cancer Neg Hx     Stomach cancer Neg Hx     Ulcerative colitis Neg Hx     Ovarian cancer Neg Hx     Uterine cancer Neg Hx     Kidney cancer Neg Hx     Bladder Cancer Neg Hx     Pancreatitis Neg Hx     Pancreatic cancer Neg Hx    MGM:  "RA  Mom:  age 55 w CVA due to bleed; depression;   Dad: ADD; HTN; Hypothyroidism  Sister: migraines; hypothyroidism    Social History     Tobacco Use    Smoking status: Never    Smokeless tobacco: Never   Substance Use Topics    Alcohol use: Yes     Comment: social    Drug use: Never   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    Objective:   BP (!) 130/93   Pulse 70   Ht 5' 2" (1.575 m)   Wt 59.9 kg (132 lb)   LMP 2023   BMI 24.14 kg/m²   BP repeated 132/83;   Physical Exam   Constitutional: She is oriented to person, place, and time. She appears well-developed and well-nourished. No distress.   HENT:   Head: Normocephalic and atraumatic.   Mouth/Throat: Oropharynx is clear and moist. No oropharyngeal exudate.   No facial rashes  Parotids not enlarged     Eyes: Pupils are equal, round, and reactive to light. Conjunctivae are normal. Right eye exhibits no discharge. Left eye exhibits no discharge. No scleral icterus.   Neck: No JVD present. No tracheal deviation present. No thyromegaly present.   Cardiovascular: Normal rate, regular rhythm and normal heart sounds. Exam reveals no gallop and no friction rub.   No murmur heard.  Pulmonary/Chest: Effort normal and breath sounds normal. No respiratory distress. She has no wheezes. She has no rales. She exhibits no tenderness.   Abdominal: Soft. Bowel sounds are normal. She exhibits no distension and no mass. There is no abdominal tenderness. There is no rebound and no guarding.   Musculoskeletal:      Cervical back: Normal range of motion and neck supple.      Comments:        Lymphadenopathy:     She has no cervical adenopathy.   Neurological: She is alert and oriented to person, place, and time. She has normal reflexes. No cranial nerve deficit. Gait normal.   Skin: Skin is warm and dry. No rash noted. She is not diaphoretic.   Psychiatric: Her behavior is normal. Mood, memory, affect, judgment and thought content " normal.   Vitals reviewed.    8/2/23: ESR 4; CRP 0.5; CHASE 1:160H;  neg pro  8/1/23:  CBC ok; CMP ok;   7/27/23: ESR <2; CRP 0.6; UA ok;  1/7/23: Vit D 25  8/9/22: Celiac testing: negative  5/23/22: Vit D 22    8/1/23: MRI brain & Cspine: No acute intracranial or intraspinous process.Scattered T2/FLAIR hyperintense lesions, some of which involving the corpus callosum.  Overall findings nonspecific and may be seen with either migraine or demyelinating disease.    MRI of the cervical spine is normal.  No intramedullary within the cervical spine. Subcentimeter left parafalcine meningioma.  Assessment:   +CHASE 1:160H neg pro   No evidence for CTD  Intermittent paresthesias: worse L face & L arm   Assoc w abn MRI  Family hx of thyroid disorders   Hypothyroidism father & sister  MGM with RA  Vitamin D insufficiency   S/P recent Vit D3 injection  Elements of anxiety;       Plan:   Discussed meaning of +CHASE in face of family hx of hypothyroidism & RA  Discussed absence of clinical rheumatologic disorder  Labs ordered but index of suspicion is very very low for APS as etiology of +CHASE & so lab ordered left for next time she has labs drawn.  Similarly for Vit D.  Discussed possible role in anxiety but need to r/o neuro issues.   Continue neuro w/u & f/u w neuro & PCP.  RTC prn      CC: MD Marco A Amaya MD          CC: Go Antonio MD

## 2023-08-07 ENCOUNTER — LAB VISIT (OUTPATIENT)
Dept: LAB | Facility: OTHER | Age: 33
End: 2023-08-07
Attending: OBSTETRICS & GYNECOLOGY
Payer: COMMERCIAL

## 2023-08-07 ENCOUNTER — OFFICE VISIT (OUTPATIENT)
Dept: OBSTETRICS AND GYNECOLOGY | Facility: CLINIC | Age: 33
End: 2023-08-07
Payer: COMMERCIAL

## 2023-08-07 VITALS
BODY MASS INDEX: 24.24 KG/M2 | DIASTOLIC BLOOD PRESSURE: 82 MMHG | SYSTOLIC BLOOD PRESSURE: 118 MMHG | HEIGHT: 62 IN | WEIGHT: 131.75 LBS

## 2023-08-07 DIAGNOSIS — Z11.3 SCREENING FOR STDS (SEXUALLY TRANSMITTED DISEASES): ICD-10-CM

## 2023-08-07 DIAGNOSIS — Z11.51 ENCOUNTER FOR SCREENING FOR HUMAN PAPILLOMAVIRUS (HPV): ICD-10-CM

## 2023-08-07 DIAGNOSIS — Z01.419 ENCOUNTER FOR ANNUAL ROUTINE GYNECOLOGICAL EXAMINATION: Primary | ICD-10-CM

## 2023-08-07 DIAGNOSIS — Z12.4 ENCOUNTER FOR PAPANICOLAOU SMEAR FOR CERVICAL CANCER SCREENING: ICD-10-CM

## 2023-08-07 LAB
HBV SURFACE AG SERPL QL IA: NORMAL
HCV AB SERPL QL IA: NORMAL
HIV 1+2 AB+HIV1 P24 AG SERPL QL IA: NORMAL
RPR SER QL: NORMAL

## 2023-08-07 PROCEDURE — 86592 SYPHILIS TEST NON-TREP QUAL: CPT | Performed by: OBSTETRICS & GYNECOLOGY

## 2023-08-07 PROCEDURE — 1159F PR MEDICATION LIST DOCUMENTED IN MEDICAL RECORD: ICD-10-PCS | Mod: CPTII,S$GLB,, | Performed by: OBSTETRICS & GYNECOLOGY

## 2023-08-07 PROCEDURE — 1160F RVW MEDS BY RX/DR IN RCRD: CPT | Mod: CPTII,S$GLB,, | Performed by: OBSTETRICS & GYNECOLOGY

## 2023-08-07 PROCEDURE — 87340 HEPATITIS B SURFACE AG IA: CPT | Performed by: OBSTETRICS & GYNECOLOGY

## 2023-08-07 PROCEDURE — 3079F DIAST BP 80-89 MM HG: CPT | Mod: CPTII,S$GLB,, | Performed by: OBSTETRICS & GYNECOLOGY

## 2023-08-07 PROCEDURE — 86803 HEPATITIS C AB TEST: CPT | Performed by: OBSTETRICS & GYNECOLOGY

## 2023-08-07 PROCEDURE — 99395 PREV VISIT EST AGE 18-39: CPT | Mod: S$GLB,,, | Performed by: OBSTETRICS & GYNECOLOGY

## 2023-08-07 PROCEDURE — 3044F HG A1C LEVEL LT 7.0%: CPT | Mod: CPTII,S$GLB,, | Performed by: OBSTETRICS & GYNECOLOGY

## 2023-08-07 PROCEDURE — 87624 HPV HI-RISK TYP POOLED RSLT: CPT | Performed by: OBSTETRICS & GYNECOLOGY

## 2023-08-07 PROCEDURE — 1159F MED LIST DOCD IN RCRD: CPT | Mod: CPTII,S$GLB,, | Performed by: OBSTETRICS & GYNECOLOGY

## 2023-08-07 PROCEDURE — 36415 COLL VENOUS BLD VENIPUNCTURE: CPT | Performed by: OBSTETRICS & GYNECOLOGY

## 2023-08-07 PROCEDURE — 87591 N.GONORRHOEAE DNA AMP PROB: CPT | Performed by: OBSTETRICS & GYNECOLOGY

## 2023-08-07 PROCEDURE — 88175 CYTOPATH C/V AUTO FLUID REDO: CPT | Performed by: OBSTETRICS & GYNECOLOGY

## 2023-08-07 PROCEDURE — 3074F SYST BP LT 130 MM HG: CPT | Mod: CPTII,S$GLB,, | Performed by: OBSTETRICS & GYNECOLOGY

## 2023-08-07 PROCEDURE — 99999 PR PBB SHADOW E&M-EST. PATIENT-LVL III: CPT | Mod: PBBFAC,,, | Performed by: OBSTETRICS & GYNECOLOGY

## 2023-08-07 PROCEDURE — 99395 PR PREVENTIVE VISIT,EST,18-39: ICD-10-PCS | Mod: S$GLB,,, | Performed by: OBSTETRICS & GYNECOLOGY

## 2023-08-07 PROCEDURE — 1160F PR REVIEW ALL MEDS BY PRESCRIBER/CLIN PHARMACIST DOCUMENTED: ICD-10-PCS | Mod: CPTII,S$GLB,, | Performed by: OBSTETRICS & GYNECOLOGY

## 2023-08-07 PROCEDURE — 99999 PR PBB SHADOW E&M-EST. PATIENT-LVL III: ICD-10-PCS | Mod: PBBFAC,,, | Performed by: OBSTETRICS & GYNECOLOGY

## 2023-08-07 PROCEDURE — 3044F PR MOST RECENT HEMOGLOBIN A1C LEVEL <7.0%: ICD-10-PCS | Mod: CPTII,S$GLB,, | Performed by: OBSTETRICS & GYNECOLOGY

## 2023-08-07 PROCEDURE — 3079F PR MOST RECENT DIASTOLIC BLOOD PRESSURE 80-89 MM HG: ICD-10-PCS | Mod: CPTII,S$GLB,, | Performed by: OBSTETRICS & GYNECOLOGY

## 2023-08-07 PROCEDURE — 87389 HIV-1 AG W/HIV-1&-2 AB AG IA: CPT | Performed by: OBSTETRICS & GYNECOLOGY

## 2023-08-07 PROCEDURE — 3008F PR BODY MASS INDEX (BMI) DOCUMENTED: ICD-10-PCS | Mod: CPTII,S$GLB,, | Performed by: OBSTETRICS & GYNECOLOGY

## 2023-08-07 PROCEDURE — 3008F BODY MASS INDEX DOCD: CPT | Mod: CPTII,S$GLB,, | Performed by: OBSTETRICS & GYNECOLOGY

## 2023-08-07 PROCEDURE — 3074F PR MOST RECENT SYSTOLIC BLOOD PRESSURE < 130 MM HG: ICD-10-PCS | Mod: CPTII,S$GLB,, | Performed by: OBSTETRICS & GYNECOLOGY

## 2023-08-08 ENCOUNTER — TELEPHONE (OUTPATIENT)
Dept: NEUROLOGY | Facility: CLINIC | Age: 33
End: 2023-08-08
Payer: COMMERCIAL

## 2023-08-08 LAB
C TRACH DNA SPEC QL NAA+PROBE: NOT DETECTED
N GONORRHOEA DNA SPEC QL NAA+PROBE: NOT DETECTED

## 2023-08-08 NOTE — TELEPHONE ENCOUNTER
Spoke with the patient and discussed her labs.  All questions answered.    MRI of the thoracic spine and lumbar puncture pending.    Go Antonio MD  [facility].

## 2023-08-09 ENCOUNTER — OFFICE VISIT (OUTPATIENT)
Dept: RHEUMATOLOGY | Facility: CLINIC | Age: 33
End: 2023-08-09
Payer: COMMERCIAL

## 2023-08-09 VITALS
HEIGHT: 62 IN | BODY MASS INDEX: 24.29 KG/M2 | SYSTOLIC BLOOD PRESSURE: 130 MMHG | DIASTOLIC BLOOD PRESSURE: 93 MMHG | WEIGHT: 132 LBS | HEART RATE: 70 BPM

## 2023-08-09 DIAGNOSIS — R93.89 ABNORMAL MRI: ICD-10-CM

## 2023-08-09 DIAGNOSIS — R76.8 POSITIVE ANA (ANTINUCLEAR ANTIBODY): Primary | ICD-10-CM

## 2023-08-09 DIAGNOSIS — E55.9 VITAMIN D INSUFFICIENCY: ICD-10-CM

## 2023-08-09 DIAGNOSIS — R20.2 PARESTHESIA: ICD-10-CM

## 2023-08-09 DIAGNOSIS — Z55.9 EDUCATIONAL CIRCUMSTANCE: ICD-10-CM

## 2023-08-09 PROCEDURE — 3044F PR MOST RECENT HEMOGLOBIN A1C LEVEL <7.0%: ICD-10-PCS | Mod: CPTII,S$GLB,, | Performed by: INTERNAL MEDICINE

## 2023-08-09 PROCEDURE — 3080F DIAST BP >= 90 MM HG: CPT | Mod: CPTII,S$GLB,, | Performed by: INTERNAL MEDICINE

## 2023-08-09 PROCEDURE — 3044F HG A1C LEVEL LT 7.0%: CPT | Mod: CPTII,S$GLB,, | Performed by: INTERNAL MEDICINE

## 2023-08-09 PROCEDURE — 99204 OFFICE O/P NEW MOD 45 MIN: CPT | Mod: S$GLB,,, | Performed by: INTERNAL MEDICINE

## 2023-08-09 PROCEDURE — 3008F PR BODY MASS INDEX (BMI) DOCUMENTED: ICD-10-PCS | Mod: CPTII,S$GLB,, | Performed by: INTERNAL MEDICINE

## 2023-08-09 PROCEDURE — 99999 PR PBB SHADOW E&M-EST. PATIENT-LVL IV: ICD-10-PCS | Mod: PBBFAC,,, | Performed by: INTERNAL MEDICINE

## 2023-08-09 PROCEDURE — 3075F SYST BP GE 130 - 139MM HG: CPT | Mod: CPTII,S$GLB,, | Performed by: INTERNAL MEDICINE

## 2023-08-09 PROCEDURE — 3008F BODY MASS INDEX DOCD: CPT | Mod: CPTII,S$GLB,, | Performed by: INTERNAL MEDICINE

## 2023-08-09 PROCEDURE — 1159F PR MEDICATION LIST DOCUMENTED IN MEDICAL RECORD: ICD-10-PCS | Mod: CPTII,S$GLB,, | Performed by: INTERNAL MEDICINE

## 2023-08-09 PROCEDURE — 3075F PR MOST RECENT SYSTOLIC BLOOD PRESS GE 130-139MM HG: ICD-10-PCS | Mod: CPTII,S$GLB,, | Performed by: INTERNAL MEDICINE

## 2023-08-09 PROCEDURE — 1160F PR REVIEW ALL MEDS BY PRESCRIBER/CLIN PHARMACIST DOCUMENTED: ICD-10-PCS | Mod: CPTII,S$GLB,, | Performed by: INTERNAL MEDICINE

## 2023-08-09 PROCEDURE — 99999 PR PBB SHADOW E&M-EST. PATIENT-LVL IV: CPT | Mod: PBBFAC,,, | Performed by: INTERNAL MEDICINE

## 2023-08-09 PROCEDURE — 1159F MED LIST DOCD IN RCRD: CPT | Mod: CPTII,S$GLB,, | Performed by: INTERNAL MEDICINE

## 2023-08-09 PROCEDURE — 1160F RVW MEDS BY RX/DR IN RCRD: CPT | Mod: CPTII,S$GLB,, | Performed by: INTERNAL MEDICINE

## 2023-08-09 PROCEDURE — 99204 PR OFFICE/OUTPT VISIT, NEW, LEVL IV, 45-59 MIN: ICD-10-PCS | Mod: S$GLB,,, | Performed by: INTERNAL MEDICINE

## 2023-08-09 PROCEDURE — 3080F PR MOST RECENT DIASTOLIC BLOOD PRESSURE >= 90 MM HG: ICD-10-PCS | Mod: CPTII,S$GLB,, | Performed by: INTERNAL MEDICINE

## 2023-08-09 RX ORDER — VITAMIN B COMPLEX
1 CAPSULE ORAL DAILY
COMMUNITY

## 2023-08-09 SDOH — SOCIAL DETERMINANTS OF HEALTH (SDOH): PROBLEMS RELATED TO EDUCATION AND LITERACY, UNSPECIFIED: Z55.9

## 2023-08-09 NOTE — PROGRESS NOTES
Rapid3 Question Responses and Scores 8/9/2023   MDHAQ Score 0   Psychologic Score 1.1   Pain Score 0   When you awakened in the morning OVER THE LAST WEEK, did you feel stiff? No   Fatigue Score 4   Global Health Score 1   RAPID3 Score 0.33     Answers submitted by the patient for this visit:  Rheumatology Questionnaire (Submitted on 8/9/2023)  fever: No  eye redness: No  mouth sores: No  headaches: Yes  shortness of breath: No  chest pain: No  trouble swallowing: No  diarrhea: No  constipation: No  unexpected weight change: No  genital sore: No  dysuria: No  During the last 3 days, have you had a skin rash?: No  Bruises or bleeds easily: No  cough: No

## 2023-08-10 LAB
HPV HR 12 DNA SPEC QL NAA+PROBE: NEGATIVE
HPV16 AG SPEC QL: NEGATIVE
HPV18 DNA SPEC QL NAA+PROBE: NEGATIVE

## 2023-08-13 LAB
FINAL PATHOLOGIC DIAGNOSIS: NORMAL
Lab: NORMAL

## 2023-08-14 ENCOUNTER — HOSPITAL ENCOUNTER (OUTPATIENT)
Dept: RADIOLOGY | Facility: OTHER | Age: 33
Discharge: HOME | End: 2023-08-14
Attending: STUDENT IN AN ORGANIZED HEALTH CARE EDUCATION/TRAINING PROGRAM
Payer: COMMERCIAL

## 2023-08-14 DIAGNOSIS — R20.2 NUMBNESS AND TINGLING OF LEFT SIDE OF FACE: ICD-10-CM

## 2023-08-14 DIAGNOSIS — R20.0 NUMBNESS AND TINGLING OF LEFT SIDE OF FACE: ICD-10-CM

## 2023-08-14 DIAGNOSIS — R93.89 ABNORMAL MRI: ICD-10-CM

## 2023-08-14 PROCEDURE — A9585 GADOBUTROL INJECTION: HCPCS | Performed by: STUDENT IN AN ORGANIZED HEALTH CARE EDUCATION/TRAINING PROGRAM

## 2023-08-14 PROCEDURE — 72157 MRI THORACIC SPINE DEMYELINATING W W/O CONTRAST: ICD-10-PCS | Mod: 26,,, | Performed by: STUDENT IN AN ORGANIZED HEALTH CARE EDUCATION/TRAINING PROGRAM

## 2023-08-14 PROCEDURE — 72157 MRI CHEST SPINE W/O & W/DYE: CPT | Mod: TC

## 2023-08-14 PROCEDURE — 25500020 PHARM REV CODE 255: Performed by: STUDENT IN AN ORGANIZED HEALTH CARE EDUCATION/TRAINING PROGRAM

## 2023-08-14 PROCEDURE — 72157 MRI CHEST SPINE W/O & W/DYE: CPT | Mod: 26,,, | Performed by: STUDENT IN AN ORGANIZED HEALTH CARE EDUCATION/TRAINING PROGRAM

## 2023-08-14 RX ORDER — GADOBUTROL 604.72 MG/ML
5 INJECTION INTRAVENOUS
Status: COMPLETED | OUTPATIENT
Start: 2023-08-14 | End: 2023-08-14

## 2023-08-14 RX ADMIN — GADOBUTROL 5 ML: 604.72 INJECTION INTRAVENOUS at 05:08

## 2023-08-15 ENCOUNTER — PATIENT MESSAGE (OUTPATIENT)
Dept: OBSTETRICS AND GYNECOLOGY | Facility: CLINIC | Age: 33
End: 2023-08-15
Payer: COMMERCIAL

## 2023-08-23 ENCOUNTER — HOSPITAL ENCOUNTER (OUTPATIENT)
Dept: RADIOLOGY | Facility: HOSPITAL | Age: 33
Discharge: HOME | End: 2023-08-23
Attending: STUDENT IN AN ORGANIZED HEALTH CARE EDUCATION/TRAINING PROGRAM
Payer: COMMERCIAL

## 2023-08-23 DIAGNOSIS — R20.0 NUMBNESS AND TINGLING OF LEFT SIDE OF FACE: ICD-10-CM

## 2023-08-23 DIAGNOSIS — R93.89 ABNORMAL MRI: ICD-10-CM

## 2023-08-23 DIAGNOSIS — R20.2 NUMBNESS AND TINGLING OF LEFT SIDE OF FACE: ICD-10-CM

## 2023-08-23 LAB
CLARITY CSF: CLEAR
CLARITY CSF: CLEAR
COLOR CSF: COLORLESS
COLOR CSF: COLORLESS
CSF TUBE NUMBER: 4
CSF TUBE NUMBER: 4
GLUCOSE CSF-MCNC: 64 MG/DL (ref 40–70)
LYMPHOCYTES NFR CSF MANUAL: 80 % (ref 40–80)
LYMPHOCYTES NFR CSF MANUAL: 90 % (ref 40–80)
MONOS+MACROS NFR CSF MANUAL: 10 % (ref 15–45)
MONOS+MACROS NFR CSF MANUAL: 20 % (ref 15–45)
PROT CSF-MCNC: 33 MG/DL (ref 15–40)
RBC # CSF: 0 /CU MM
RBC # CSF: 0 /CU MM
SPECIMEN VOL CSF: 2 ML
SPECIMEN VOL CSF: 4 ML
WBC # CSF: 1 /CU MM (ref 0–5)
WBC # CSF: 1 /CU MM (ref 0–5)

## 2023-08-23 PROCEDURE — 89051 BODY FLUID CELL COUNT: CPT | Performed by: STUDENT IN AN ORGANIZED HEALTH CARE EDUCATION/TRAINING PROGRAM

## 2023-08-23 PROCEDURE — 62328 FL LUMBAR PUNCTURE DIAGNOSTIC WITH IMAGING: ICD-10-PCS | Mod: ,,, | Performed by: RADIOLOGY

## 2023-08-23 PROCEDURE — 62328 DX LMBR SPI PNXR W/FLUOR/CT: CPT

## 2023-08-23 PROCEDURE — 99000 SPECIMEN HANDLING OFFICE-LAB: CPT | Performed by: STUDENT IN AN ORGANIZED HEALTH CARE EDUCATION/TRAINING PROGRAM

## 2023-08-23 PROCEDURE — 62328 DX LMBR SPI PNXR W/FLUOR/CT: CPT | Mod: ,,, | Performed by: RADIOLOGY

## 2023-08-23 PROCEDURE — 25000003 PHARM REV CODE 250: Performed by: STUDENT IN AN ORGANIZED HEALTH CARE EDUCATION/TRAINING PROGRAM

## 2023-08-23 PROCEDURE — 83521 IG LIGHT CHAINS FREE EACH: CPT | Performed by: STUDENT IN AN ORGANIZED HEALTH CARE EDUCATION/TRAINING PROGRAM

## 2023-08-23 PROCEDURE — 82945 GLUCOSE OTHER FLUID: CPT | Performed by: STUDENT IN AN ORGANIZED HEALTH CARE EDUCATION/TRAINING PROGRAM

## 2023-08-23 PROCEDURE — 84157 ASSAY OF PROTEIN OTHER: CPT | Performed by: STUDENT IN AN ORGANIZED HEALTH CARE EDUCATION/TRAINING PROGRAM

## 2023-08-23 RX ORDER — LIDOCAINE HYDROCHLORIDE 10 MG/ML
3 INJECTION INFILTRATION; PERINEURAL ONCE
Status: COMPLETED | OUTPATIENT
Start: 2023-08-23 | End: 2023-08-23

## 2023-08-23 RX ADMIN — LIDOCAINE HYDROCHLORIDE 3 ML: 10 INJECTION, SOLUTION INFILTRATION; PERINEURAL at 02:08

## 2023-08-23 NOTE — PROCEDURES
Radiology Post-Procedure Note    Pre Op Diagnosis: numbness    Post Op Diagnosis: Same    Procedure: lumbar puncture    Procedure performed by: Aramis Montoya DO; Andres Hairston MD    Written Informed Consent Obtained: Yes    Specimen Removed: 11 mL CSF    Estimated Blood Loss: Minimal    Findings: Following written informed consent and sterile prep and drape, a 22 gauge spinal needle was inserted at L3 - L4 intralaminar space under fluoroscopic surveillance.  11 mL clear CSF removed and sent to the lab for further analysis. Opening pressure was 15 mL H20 in prone position. There were no complications.    Patient tolerated procedure well.    Aramis Montoya DO  PGY-V  Diagnostic and Interventional Radiology  [facility]

## 2023-08-23 NOTE — H&P
"Radiology History & Physical      SUBJECTIVE:     Chief Complaint: numbness    History of Present Illness:  Josefina Boston is a 32 y.o. female who presents for lumbar puncture.  Past Medical History:   Diagnosis Date    Anxiety     Attention deficit disorder (ADD)      No past surgical history on file.    Home Meds:   Prior to Admission medications    Medication Sig Start Date End Date Taking? Authorizing Provider   b complex vitamins capsule Take 1 capsule by mouth once daily.    Provider, Historical   EScitalopram oxalate (LEXAPRO) 5 MG Tab Take 1 tablet (5 mg total) by mouth once daily. 7/31/23 7/30/24  Marco A Farley MD   LINZESS 72 mcg Cap capsule Take 72 mcg by mouth. 5/4/23   Provider, Historical   LORazepam (ATIVAN) 0.5 MG tablet Take 1 tablet (0.5 mg total) by mouth 2 (two) times daily. As needed for severe anxiety; use very sparingly. Do not use regularly 7/31/23 8/30/23  Marco A Farley MD   ondansetron (ZOFRAN-ODT) 4 MG TbDL Take 4 mg by mouth every 4 (four) hours as needed. 5/4/23   Provider, Historical     Anticoagulants/Antiplatelets: no anticoagulation    Allergies: Review of patient's allergies indicates:  No Known Allergies  Sedation History:  no adverse reactions        OBJECTIVE:     Vital Signs (Most Recent)       Physical Exam:    General: no acute distress  Mental Status: alert  HEENT: normocephalic, atraumatic  Chest: unlabored breathing  Heart: regular heart rate  Abdomen: nondistended  Extremity: moves all extremities    Laboratory  No results found for: "INR", "PT", "PTT"    Lab Results   Component Value Date    WBC 6.20 08/01/2023    HGB 14.0 08/01/2023    HCT 42.4 08/01/2023    MCV 82 08/01/2023     08/01/2023      Lab Results   Component Value Date    GLU 95 08/01/2023     08/01/2023    K 3.8 08/01/2023     08/01/2023    CO2 25 08/01/2023    BUN 10 08/01/2023    CREATININE 0.8 08/01/2023    CALCIUM 9.5 08/01/2023    MG 1.9 08/01/2023    ALT 16 08/01/2023    " AST 23 08/01/2023    ALBUMIN 4.4 08/01/2023    BILITOT 0.7 08/01/2023       ASSESSMENT/PLAN:     Sedation Plan: local anesthetic only  Patient will undergo lumbar puncture.    Aramis Montoya, DO  PGY-V  Diagnostic and Interventional Radiology  [facility]

## 2023-08-23 NOTE — LETTER
August 28, 2023    Josefina Boston  [address] [address]         Radiology    MD Paresh Oconnell MD Juan Gimenez, MD Tyler Sandow, MD Andrew Steven, MD    Patient:  Josefina Boston  YOB: 1990  Date of Visit:  8/23/2023      To Whom it May Concern:    Josefina Boston was seen in the Radiology Department on 8/23/2023 and must maintain bed rest with minimal exertion from this date until symptoms such as significant headache related to her condition/care are resolved before returning to work.     If you have any questions or concerns please contact me or our office.    Sincerely,     Aramis Montoya,   Diagnostic and Interventional Radiology   [facility] [address] [place] [address]  phone [phone]  fax [phone]  [email or web address]

## 2023-08-28 LAB — KAPPA LC FREE CSF-MCNC: 0.04 MG/DL

## 2023-08-30 ENCOUNTER — PATIENT MESSAGE (OUTPATIENT)
Dept: PRIMARY CARE CLINIC | Facility: CLINIC | Age: 33
End: 2023-08-30
Payer: COMMERCIAL

## 2023-08-30 ENCOUNTER — PATIENT MESSAGE (OUTPATIENT)
Dept: NEUROLOGY | Facility: CLINIC | Age: 33
End: 2023-08-30
Payer: COMMERCIAL

## 2023-08-30 RX ORDER — ESCITALOPRAM OXALATE 10 MG/1
10 TABLET ORAL DAILY
Qty: 30 TABLET | Refills: 1 | Status: SHIPPED | OUTPATIENT
Start: 2023-08-30 | End: 2023-10-18 | Stop reason: SDUPTHER

## 2023-09-04 NOTE — PROGRESS NOTES
"Chief Complaint: Well Woman Exam     HPI:      Josefina Boston is a 32 y.o.  who presents today for well woman exam.  LMP: Patient's last menstrual period was 2023.  No issues, problems, or complaints. Specifically, patient denies abnormal vaginal bleeding, discharge, pelvic pain, urinary problems, or changes in appetite. Ms. Boston is currently sexually active with a single male partner. She would like STD screening today. Patient has regular monthly menses. Patient's last menstrual period was 2023. She is currently using condoms for contraception. Cycles regular lasting 5 days.    Previous Pap: no abnormalities/HPV negative  (2023)    Gardasil: Completed     OB History          0    Para   0    Term   0       0    AB   0    Living   0         SAB   0    IAB   0    Ectopic   0    Multiple   0    Live Births   0                   ROS:     GENERAL: Denies unintentional weight gain or weight loss. Feeling well overall.   SKIN: Denies rash or lesions.   HEENT: Denies headaches, or vision changes.   CARDIOVASCULAR: Denies palpitations or chest pain.   RESPIRATORY: Denies shortness of breath or dyspnea on exertion.  BREASTS: Denies pain, lumps, or nipple discharge.   ABDOMEN: Denies abdominal pain, constipation, diarrhea, nausea, vomiting, change in appetite.  URINARY: Denies frequency, dysuria, hematuria.  NEUROLOGIC: Denies syncope or weakness.   PSYCHIATRIC: Denies depression, anxiety or mood swings.    Physical Exam:      PHYSICAL EXAM:  /82   Ht 5' 2" (1.575 m)   Wt 59.8 kg (131 lb 11.6 oz)   LMP 2023   BMI 24.09 kg/m²   Body mass index is 24.09 kg/m².     APPEARANCE: Well nourished, well developed, in no acute distress.  PSYCH: Appropriate mood and affect.  SKIN: No acne or hirsutism  NECK: Neck symmetric without masses or thyromegaly  NODES: No inguinal, axillary, or supraclavicular lymph node enlargement  ABDOMEN: Soft.  No tenderness or masses.  "   CARDIOVASCULAR: No edema of peripheral extremities  BREASTS: Symmetrical, no skin changes or visible lesions.  No palpable masses or nipple discharge bilaterally.  PELVIC: Normal external genitalia without lesions.  Normal hair distribution.  Adequate perineal body, normal urethral meatus.  Vagina moist and well rugated without lesions or discharge.  Cervix pink, without lesions, discharge or tenderness.  No significant cystocele or rectocele.  Bimanual exam shows uterus to be normal size, regular, mobile and nontender.  Adnexa without masses or tenderness.      Assessment/Plan:     Encounter for annual routine gynecological examination    Screening for STDs (sexually transmitted diseases)  -     Hepatitis B Surface Antigen; Future; Expected date: 08/07/2023  -     Hepatitis C Antibody; Future; Expected date: 11/07/2023  -     C. trachomatis/N. gonorrhoeae by AMP DNA [facility]; Cervicovaginal  -     HIV 1/2 Ag/Ab (4th Gen); Future; Expected date: 08/07/2023  -     RPR; Future; Expected date: 08/07/2023    Encounter for Papanicolaou smear for cervical cancer screening  -     Liquid-Based Pap Smear, Screening    Encounter for screening for human papillomavirus (HPV)  -     HPV High Risk Genotypes, PCR      RTC 1 year    Counseling:     Patient was counseled today on current ASCCP pap guidelines, the recommendation for yearly pelvic exams, healthy diet and exercise routines, breast self awareness.She is to see her PCP for other health maintenance.     Use of the [facility] Patient Portal discussed and encouraged during today's visit.       Bridgett Mcmullen MD      As of April 1, 2021, the Cures Act has been passed nationally. This new law requires that all doctors progress notes, lab results, pathology reports and radiology reports be released IMMEDIATELY to the patient in the patient portal. That means that the results are released to you at the EXACT same time they are released to me. Therefore, with all of the patients  that I have I am not able to reply to each patient exactly when the results come in. So there will be a delay from when you see the results to when I see them and have time to come up with a response to send you. Also I only see these results when I am on the computer at work. So if the results come in over the weekend or after 5 pm of a work day, I will not see them until the next business day. As you can tell, this is a challenge as a physician to give every patient the quick response they hope for and deserve. So please be patient! Thanks for understanding, Dr. Mcmullen

## 2023-10-19 RX ORDER — ESCITALOPRAM OXALATE 10 MG/1
10 TABLET ORAL DAILY
Qty: 90 TABLET | Refills: 0 | Status: SHIPPED | OUTPATIENT
Start: 2023-10-19 | End: 2023-11-13 | Stop reason: SDUPTHER

## 2023-10-20 ENCOUNTER — PATIENT MESSAGE (OUTPATIENT)
Dept: NEUROLOGY | Facility: CLINIC | Age: 33
End: 2023-10-20

## 2023-10-26 DIAGNOSIS — H54.7 DECREASED VISION: Primary | ICD-10-CM

## 2023-11-01 ENCOUNTER — PATIENT MESSAGE (OUTPATIENT)
Dept: ADMINISTRATIVE | Facility: OTHER | Age: 33
End: 2023-11-01

## 2023-11-01 ENCOUNTER — TELEPHONE (OUTPATIENT)
Dept: ADMINISTRATIVE | Facility: OTHER | Age: 33
End: 2023-11-01

## 2023-11-01 NOTE — TELEPHONE ENCOUNTER
MARILEE with scheduled Ophthalmology appointment of 2-28-24, and contact number provided for any questions. My Chart message to be sent.

## 2023-11-13 RX ORDER — ESCITALOPRAM OXALATE 10 MG/1
10 TABLET ORAL DAILY
Qty: 90 TABLET | Refills: 1 | Status: SHIPPED | OUTPATIENT
Start: 2023-11-13 | End: 2024-11-12

## 2023-11-13 NOTE — TELEPHONE ENCOUNTER
No care due was identified.  [facility] Embedded Care Due Messages. Reference number: [id number].   11/13/2023 8:38:12 AM CST

## 2023-12-13 ENCOUNTER — PATIENT MESSAGE (OUTPATIENT)
Dept: NEUROLOGY | Facility: CLINIC | Age: 33
End: 2023-12-13

## 2023-12-13 NOTE — TELEPHONE ENCOUNTER
Staff spoke to pt, and informed her that the provider is out on vacation. He will return to clinic on next week.

## 2023-12-18 DIAGNOSIS — R93.89 ABNORMAL MRI: Primary | ICD-10-CM

## 2024-01-24 ENCOUNTER — PATIENT MESSAGE (OUTPATIENT)
Dept: NEUROLOGY | Facility: CLINIC | Age: 34
End: 2024-01-24

## 2024-01-24 ENCOUNTER — TELEPHONE (OUTPATIENT)
Dept: NEUROLOGY | Facility: CLINIC | Age: 34
End: 2024-01-24

## 2024-02-07 ENCOUNTER — TELEPHONE (OUTPATIENT)
Dept: NEUROLOGY | Facility: CLINIC | Age: 34
End: 2024-02-07
Payer: COMMERCIAL

## 2024-04-02 NOTE — TELEPHONE ENCOUNTER
No care due was identified.  [facility] Embedded Care Due Messages. Reference number: [id number].   4/02/2024 3:12:04 PM CDT

## 2024-04-03 ENCOUNTER — TELEPHONE (OUTPATIENT)
Dept: NEUROLOGY | Facility: CLINIC | Age: 34
End: 2024-04-03

## 2024-04-03 NOTE — TELEPHONE ENCOUNTER
----- Message from Merlyn Toscano sent at 4/2/2024  5:02 PM CDT -----  Type:  Call    Who Called:pt  Does the patient know what this is regarding?:call  Would the patient rather a call back or a response via [facility]? call  Best Call Back Number: [phone]  Additional Information: Pt is ready to schedule MRI, but it is still in review.

## 2024-04-04 RX ORDER — ESCITALOPRAM OXALATE 10 MG/1
10 TABLET ORAL DAILY
Qty: 90 TABLET | Refills: 1 | Status: SHIPPED | OUTPATIENT
Start: 2024-04-04 | End: 2025-04-04

## 2024-04-04 RX ORDER — LORAZEPAM 0.5 MG/1
0.5 TABLET ORAL 2 TIMES DAILY
Qty: 30 TABLET | Refills: 0 | Status: SHIPPED | OUTPATIENT
Start: 2024-04-04 | End: 2024-04-15 | Stop reason: SDUPTHER

## 2024-04-09 ENCOUNTER — PATIENT MESSAGE (OUTPATIENT)
Dept: PRIMARY CARE CLINIC | Facility: CLINIC | Age: 34
End: 2024-04-09

## 2024-04-09 ENCOUNTER — PATIENT MESSAGE (OUTPATIENT)
Dept: NEUROLOGY | Facility: CLINIC | Age: 34
End: 2024-04-09

## 2024-04-15 RX ORDER — LORAZEPAM 0.5 MG/1
0.5 TABLET ORAL 2 TIMES DAILY
Qty: 30 TABLET | Refills: 0 | Status: SHIPPED | OUTPATIENT
Start: 2024-04-15 | End: 2024-05-20 | Stop reason: SDUPTHER

## 2024-05-20 RX ORDER — LORAZEPAM 0.5 MG/1
0.5 TABLET ORAL 2 TIMES DAILY
Qty: 30 TABLET | Refills: 0 | Status: SHIPPED | OUTPATIENT
Start: 2024-05-20 | End: 2024-06-19

## 2024-05-20 NOTE — TELEPHONE ENCOUNTER
No care due was identified.  [facility] Embedded Care Due Messages. Reference number: [id number].   5/20/2024 10:39:56 AM CDT

## 2024-05-20 NOTE — TELEPHONE ENCOUNTER
[facility] does not accept pt insurance and pharm stated they cannot transfer LORazepam (ATIVAN) 0.5 MG tablet , can we cancel and retransmit to [facility] pharmacy [address] [place] [id number]     LOV 07/31/23